# Patient Record
Sex: FEMALE | URBAN - METROPOLITAN AREA
[De-identification: names, ages, dates, MRNs, and addresses within clinical notes are randomized per-mention and may not be internally consistent; named-entity substitution may affect disease eponyms.]

---

## 2023-09-28 PROBLEM — Z00.00 ENCOUNTER FOR PREVENTIVE HEALTH EXAMINATION: Status: ACTIVE | Noted: 2023-09-28

## 2023-09-29 ENCOUNTER — OUTPATIENT (OUTPATIENT)
Dept: OUTPATIENT SERVICES | Facility: HOSPITAL | Age: 38
LOS: 1 days | End: 2023-09-29
Payer: COMMERCIAL

## 2023-09-29 ENCOUNTER — APPOINTMENT (OUTPATIENT)
Dept: ULTRASOUND IMAGING | Facility: CLINIC | Age: 38
End: 2023-09-29

## 2023-09-29 DIAGNOSIS — E04.9 NONTOXIC GOITER, UNSPECIFIED: ICD-10-CM

## 2023-09-29 PROCEDURE — 76536 US EXAM OF HEAD AND NECK: CPT

## 2023-09-29 PROCEDURE — 76536 US EXAM OF HEAD AND NECK: CPT | Mod: 26

## 2024-11-25 ENCOUNTER — EMERGENCY (EMERGENCY)
Facility: HOSPITAL | Age: 39
LOS: 1 days | Discharge: HOME | End: 2024-11-25
Attending: EMERGENCY MEDICINE
Payer: COMMERCIAL

## 2024-11-25 VITALS
SYSTOLIC BLOOD PRESSURE: 123 MMHG | DIASTOLIC BLOOD PRESSURE: 84 MMHG | OXYGEN SATURATION: 97 % | TEMPERATURE: 99 F | HEART RATE: 92 BPM | WEIGHT: 110.89 LBS | RESPIRATION RATE: 20 BRPM

## 2024-11-25 DIAGNOSIS — K21.9 GASTRO-ESOPHAGEAL REFLUX DISEASE WITHOUT ESOPHAGITIS: ICD-10-CM

## 2024-11-25 DIAGNOSIS — E04.1 NONTOXIC SINGLE THYROID NODULE: ICD-10-CM

## 2024-11-25 LAB
ALBUMIN SERPL ELPH-MCNC: 4.2 G/DL — SIGNIFICANT CHANGE UP (ref 3.3–5)
ALP SERPL-CCNC: 46 U/L — SIGNIFICANT CHANGE UP (ref 40–120)
ALT FLD-CCNC: 17 U/L — SIGNIFICANT CHANGE UP (ref 10–45)
ANION GAP SERPL CALC-SCNC: 13 MMOL/L — SIGNIFICANT CHANGE UP (ref 5–17)
APTT BLD: 34.4 SEC — SIGNIFICANT CHANGE UP (ref 24.5–35.6)
AST SERPL-CCNC: 16 U/L — SIGNIFICANT CHANGE UP (ref 10–40)
BASOPHILS # BLD AUTO: 0.04 K/UL — SIGNIFICANT CHANGE UP (ref 0–0.2)
BASOPHILS NFR BLD AUTO: 0.4 % — SIGNIFICANT CHANGE UP (ref 0–2)
BILIRUB SERPL-MCNC: 0.3 MG/DL — SIGNIFICANT CHANGE UP (ref 0.2–1.2)
BUN SERPL-MCNC: 11 MG/DL — SIGNIFICANT CHANGE UP (ref 7–23)
CALCIUM SERPL-MCNC: 9.3 MG/DL — SIGNIFICANT CHANGE UP (ref 8.4–10.5)
CHLORIDE SERPL-SCNC: 105 MMOL/L — SIGNIFICANT CHANGE UP (ref 96–108)
CO2 SERPL-SCNC: 22 MMOL/L — SIGNIFICANT CHANGE UP (ref 22–31)
CREAT SERPL-MCNC: 0.52 MG/DL — SIGNIFICANT CHANGE UP (ref 0.5–1.3)
EGFR: 122 ML/MIN/1.73M2 — SIGNIFICANT CHANGE UP
EOSINOPHIL # BLD AUTO: 0.12 K/UL — SIGNIFICANT CHANGE UP (ref 0–0.5)
EOSINOPHIL NFR BLD AUTO: 1.3 % — SIGNIFICANT CHANGE UP (ref 0–6)
GLUCOSE SERPL-MCNC: 97 MG/DL — SIGNIFICANT CHANGE UP (ref 70–99)
HCG SERPL-ACNC: <2 MIU/ML — SIGNIFICANT CHANGE UP
HCT VFR BLD CALC: 38.1 % — SIGNIFICANT CHANGE UP (ref 34.5–45)
HGB BLD-MCNC: 12.5 G/DL — SIGNIFICANT CHANGE UP (ref 11.5–15.5)
IMM GRANULOCYTES NFR BLD AUTO: 0.2 % — SIGNIFICANT CHANGE UP (ref 0–0.9)
INR BLD: 1.05 RATIO — SIGNIFICANT CHANGE UP (ref 0.85–1.16)
LYMPHOCYTES # BLD AUTO: 1.68 K/UL — SIGNIFICANT CHANGE UP (ref 1–3.3)
LYMPHOCYTES # BLD AUTO: 18.8 % — SIGNIFICANT CHANGE UP (ref 13–44)
MAGNESIUM SERPL-MCNC: 2.2 MG/DL — SIGNIFICANT CHANGE UP (ref 1.6–2.6)
MCHC RBC-ENTMCNC: 28.2 PG — SIGNIFICANT CHANGE UP (ref 27–34)
MCHC RBC-ENTMCNC: 32.8 G/DL — SIGNIFICANT CHANGE UP (ref 32–36)
MCV RBC AUTO: 86 FL — SIGNIFICANT CHANGE UP (ref 80–100)
MONOCYTES # BLD AUTO: 0.78 K/UL — SIGNIFICANT CHANGE UP (ref 0–0.9)
MONOCYTES NFR BLD AUTO: 8.7 % — SIGNIFICANT CHANGE UP (ref 2–14)
NEUTROPHILS # BLD AUTO: 6.3 K/UL — SIGNIFICANT CHANGE UP (ref 1.8–7.4)
NEUTROPHILS NFR BLD AUTO: 70.6 % — SIGNIFICANT CHANGE UP (ref 43–77)
NRBC # BLD: 0 /100 WBCS — SIGNIFICANT CHANGE UP (ref 0–0)
PLATELET # BLD AUTO: 317 K/UL — SIGNIFICANT CHANGE UP (ref 150–400)
POTASSIUM SERPL-MCNC: 4 MMOL/L — SIGNIFICANT CHANGE UP (ref 3.5–5.3)
POTASSIUM SERPL-SCNC: 4 MMOL/L — SIGNIFICANT CHANGE UP (ref 3.5–5.3)
PROT SERPL-MCNC: 7.6 G/DL — SIGNIFICANT CHANGE UP (ref 6–8.3)
PROTHROM AB SERPL-ACNC: 12.1 SEC — SIGNIFICANT CHANGE UP (ref 9.9–13.4)
RBC # BLD: 4.43 M/UL — SIGNIFICANT CHANGE UP (ref 3.8–5.2)
RBC # FLD: 13.4 % — SIGNIFICANT CHANGE UP (ref 10.3–14.5)
SODIUM SERPL-SCNC: 140 MMOL/L — SIGNIFICANT CHANGE UP (ref 135–145)
T3 SERPL-MCNC: 97 NG/DL — SIGNIFICANT CHANGE UP (ref 80–200)
T4 FREE SERPL-MCNC: 1.1 NG/DL — SIGNIFICANT CHANGE UP (ref 0.9–1.8)
THYROPEROXIDASE AB SERPL-ACNC: <9 IU/ML — SIGNIFICANT CHANGE UP
TROPONIN T, HIGH SENSITIVITY RESULT: <6 NG/L — SIGNIFICANT CHANGE UP (ref 0–51)
TSH SERPL-MCNC: 1.26 UIU/ML — SIGNIFICANT CHANGE UP (ref 0.27–4.2)
WBC # BLD: 8.94 K/UL — SIGNIFICANT CHANGE UP (ref 3.8–10.5)
WBC # FLD AUTO: 8.94 K/UL — SIGNIFICANT CHANGE UP (ref 3.8–10.5)

## 2024-11-25 PROCEDURE — 99223 1ST HOSP IP/OBS HIGH 75: CPT

## 2024-11-25 PROCEDURE — 99204 OFFICE O/P NEW MOD 45 MIN: CPT

## 2024-11-25 PROCEDURE — 93880 EXTRACRANIAL BILAT STUDY: CPT | Mod: 26,59

## 2024-11-25 PROCEDURE — 76536 US EXAM OF HEAD AND NECK: CPT | Mod: 26

## 2024-11-25 RX ORDER — SODIUM CHLORIDE 9 MG/ML
1000 INJECTION, SOLUTION INTRAMUSCULAR; INTRAVENOUS; SUBCUTANEOUS
Refills: 0 | Status: ACTIVE | OUTPATIENT
Start: 2024-11-25 | End: 2025-10-24

## 2024-11-25 RX ADMIN — SODIUM CHLORIDE 100 MILLILITER(S): 9 INJECTION, SOLUTION INTRAMUSCULAR; INTRAVENOUS; SUBCUTANEOUS at 17:49

## 2024-11-25 NOTE — ED ADULT NURSE NOTE - NSFALLUNIVINTERV_ED_ALL_ED
Bed/Stretcher in lowest position, wheels locked, appropriate side rails in place/Call bell, personal items and telephone in reach/Instruct patient to call for assistance before getting out of bed/chair/stretcher/Non-slip footwear applied when patient is off stretcher/Sells to call system/Physically safe environment - no spills, clutter or unnecessary equipment/Purposeful proactive rounding/Room/bathroom lighting operational, light cord in reach

## 2024-11-25 NOTE — ED CDU PROVIDER DISPOSITION NOTE - CLINICAL COURSE
39-year-old female presenting for palpitations and neck swelling.  Patient reports that she has a similar presentation 2 years ago (treated at [facility]) was related with hyperthyroidism and was treated on an unknown medication for few days and it resolved.  Symptoms returned over the past week.  Associated with pain and anterior neck swelling, intermittent palpitations.  Denies any recent illnesses does endorse a 17 pound weight loss over the past few months however was intentional with exercise and diet. Endorses regular menstrual periods.     In ED, labs unremarkable, US showed___________, endocrine consulted for evaluation of thyroid cysts, placed in CDU for endo c/s and IR drainage of thyroid cyst. 39-year-old female presenting for palpitations and neck swelling.  Patient reports that she has a similar presentation 2 years ago (treated at [facility]) was related with hyperthyroidism and was treated on an unknown medication for few days and it resolved.  Symptoms returned over the past week.  Associated with pain and anterior neck swelling, intermittent palpitations.  Denies any recent illnesses does endorse a 17 pound weight loss over the past few months however was intentional with exercise and diet. Endorses regular menstrual periods.     In ED, labs unremarkable, US showed___________, endocrine consulted for evaluation of thyroid cysts, placed in CDU for endo c/s and IR drainage of thyroid cyst. In cdu, pt seen by endo and ENT. IR and ultrasound cannot do the FNA for 3 days. pt does not require inpatient for this, however, we did offer to patient for also pain control and patient declined. pt wants to go home. pt will f/up outpatient. F/up coordinator arranging rapid f/up appointments.

## 2024-11-25 NOTE — ED CDU PROVIDER DISPOSITION NOTE - NSFOLLOWUPINSTRUCTIONS_ED_ALL_ED_FT
Follow up with your endocrinologist in the next week. Call their office to make an appointment    Follow up with your primary care provider in the next 2-3 days    Return to the Emergency Department for any pain, redness, swelling, drainage/discharge from the drainage site, fevers, chills, shortness of breath, difficulty breathing and all other concerns. 1. Stay hydrated.  2. Take Tylenol 975mg every 8hrs for pain as needed. Can take IBuprofen 600mg every 6-8hrs for pain as needed with food- would discuss with specialist if ok to take prior to procedure.  3. Follow up with your PCP in 1-2 days. Follow up with  [facility] upon discharge (Dr. Giordano, Dr. Martinez, Dr. Resendez, Dr. Grewal) call [phone] to make an appointment. (we are also working on getting you an expedited appointment. If you don't hear from us you can contact [phone]  (Bring printed results to your doctor visit).  4. Return if symptoms, worsen, fever, weakness, chest pain, difficulty breathing, dizziness and all other concerns. 1. Stay hydrated.  2. Take Tylenol 975mg every 8hrs for pain as needed. Can take IBuprofen 600mg every 6-8hrs for pain as needed with food- would discuss with specialist if ok to take prior to procedure.  3. Follow up with your PCP in 1-2 days. Follow up with  [facility] upon discharge (Dr. Giordano, Dr. Martinez, Dr. Resendez, Dr. Grewal) call [phone] to make an appointment. (we are also working on getting you an expedited appointment. If you don't hear from us you can contact [phone] - our follow up coordinator). You will also need Endocrinology follow up, we are also helping coordinate this but if you don't hear from us please contact coordinator.  Follow up outpatient with Dr. Ira Harrell for further management, information given.   (Bring printed results to your doctor visit).  4. Return if symptoms, worsen, fever, weakness, difficulty breathing, difficulty swallowing and all other concerns.  ***you need U/S guided FNA biopsy of thyroid which [facility] surgery or Endo can send you for, please follow up*****    Please follow up with your doctors:  CT neck: "Dominant left thyroid nodule better evaluated on the recent thyroid ultrasound. Mild tracheal narrowing and rightward deviation."  U/S thyroid: "1. Bilateral thyroid gland nodules, as detailed above. There is interval   increase in size and complexity of a left thyroid lobe complex mixed   solid cystic hypoechoic nodule which now measures 4.2 x 2.8 x 3.7 cm,   TIRADS 3. This contains solid components with arterial flow as well as   increased complexity of the cystic component. Low-level echoes within the   cystic component may be due to interval hemorrhage into this lesion.   Further evaluation with fine-needle aspiration biopsy under ultrasound   guidance is recommended.  2. Interval increase in size of 1.2 cm solid, mildly hypoechoic nodule   within the posterior mid to lower pole of the right thyroid lobe, TI RADS   4. Further assessment with FNA of this nodule may also be considered.  3. Internal echoes are noted within the left common carotid artery, with   the appearance of low velocity/sluggish flow on real-time imaging. The   left common carotid artery is demonstrated to be grossly patent on this   examination but otherwise not assessed. The significance of this finding   is uncertain. For further evaluation, dedicated carotid Doppler   ultrasound is advised."  Carotid duplex negative.    Thyroid Nodule  An adult, showing the location of the thyroid gland.  A thyroid nodule is an isolated growth of thyroid cells that forms a lump in the thyroid gland. The thyroid gland is a butterfly-shaped gland found in the lower front of the neck. It sends chemical messengers (hormones) through the blood to all parts of the body. These hormones are important in regulating body temperature and helping the body use energy.    Thyroid nodules are common. Most are not cancerous (are benign). You may have one nodule or several nodules.    There are different types of thyroid nodules. They include nodules that:  Grow and fill with fluid (thyroid cysts).  Produce too much thyroid hormone (hot nodules or hyperthyroid).  Produce no thyroid hormone (cold nodules or hypothyroid).  Form from cancer cells (thyroid cancers).  What are the causes?  In most cases, the cause of thyroid nodules is not known.    What increases the risk?  The following factors may make you more likely to develop thyroid nodules:  Age. Thyroid nodules are more common in people who are older than 45 years.  Female gender.  A family history that includes:  Thyroid nodules.  Pheochromocytoma.  Thyroid carcinoma.  Hyperparathyroidism.  Certain thyroid diseases, such as Hashimoto's thyroiditis.  Lack of iodine in your diet.  A history of head and neck radiation, such as from cancer treatments.  Type 2 diabetes.  What are the signs or symptoms?  In many cases, there are no symptoms. If you have symptoms, they may include:  A lump in your lower neck.  Feeling pressure, fullness, or a tickle in your throat.  Pain in your neck, jaw, or ear.  Having trouble swallowing or breathing.  Hot nodules may cause:  Weight loss.  Warm, flushed skin.  Feeling hot.  Feeling nervous.  A rapid or irregular heartbeat.  Cold nodules may cause:  Weight gain.  Dry skin.  Hair loss, brittle hair, or both.  Feeling cold.  Fatigue.  Thyroid cancer nodules may cause:  Hard nodules that can be felt along the thyroid gland.  Hoarseness.  Lumps in the tissue (lymph nodes) near your thyroid gland.  How is this diagnosed?  A thyroid nodule may be felt by your health care provider during a physical exam. This condition may also be diagnosed based on your symptoms. You may also have tests, including:  Blood tests to check how well your thyroid is working.  An ultrasound. This may be done to confirm the diagnosis.  A biopsy. This involves taking a sample from the nodule and looking at it under a microscope.  A thyroid scan. This test creates an image of the thyroid gland using a radioactive tracer.  Imaging tests such as an MRI or CT scan. These may be done if:  A nodule is large.  A nodule is blocking your airway.  Cancer is suspected.  How is this treated?  Treatment depends on the cause and size of your nodule or nodules. If a nodule is benign, treatment may not be necessary. Your health care provider may monitor the nodule to see if it goes away without treatment. If a nodule continues to grow, is cancerous, or does not go away, treatment may be needed. Treatment may include:  Having a cystic nodule drained with a needle.  Ablation therapy. In this treatment, alcohol is injected into the area of the nodule to destroy the cells. Ablation with heat may also be used. This is called thermal ablation.  Radioactive iodine. In this treatment, radioactive iodine is given as a pill or liquid that you drink. This substance causes the thyroid nodule to shrink.  Surgery to remove the nodule or nodules. Part or all of your thyroid gland may also need to be removed.  Medicines to treat hyperthyroidism.  Follow these instructions at home:  Pay attention to any changes in your thyroid nodule or nodules.  Take over-the-counter and prescription medicines only as told by your health care provider.  Keep all follow-up visits. This is important.  Contact a health care provider if:  You have trouble sleeping.  You have muscle weakness.  You have significant weight loss without changing your eating habits.  You feel nervous.  You have trouble swallowing.  You have increased swelling.  You have a rapid or irregular heartbeat.  Get help right away if:  You have chest pain.  You faint or lose consciousness.  Your nodule makes it hard for you to breathe.  These symptoms may be an emergency. Get help right away. Call 911.  Do not wait to see if the symptoms will go away.  Do not drive yourself to the hospital.  Summary  A thyroid nodule is an isolated growth of thyroid cells that forms a lump in your thyroid gland.  Thyroid nodules are common. Most are not cancerous.  Your health care provider may monitor the nodule to see if it goes away without treatment. If a nodule continues to grow, is cancerous, or does not go away, treatment may be needed.  Treatment depends on the cause and size of your nodule or nodules.  This information is not intended to replace advice given to you by your health care provider. Make sure you discuss any questions you have with your health care provider.    Document Revised: 10/31/2022 Document Reviewed: 10/31/2022  [employer] Patient Education © 2024 [employer]  [employer] logo  Terms and Conditions  Privacy Policy  Editorial Policy  All content on this site: Copyright © 2024 Else[employer], its licensors, and contributors. All rights are reserved, including those for text and data mining, AI training, and similar technologies. For all open access content, the Creative Commons licensing terms apply.  Cookies are used by this site. To decline or learn more, visit our Cookies page.

## 2024-11-25 NOTE — CONSULT NOTE ADULT - PROBLEM SELECTOR RECOMMENDATION 2
- Recommend trial of PPI  - Avoid common triggers, which includes but is not limited to, spicy/acidic foods, coffee, chocolate, mint, etc.  - Avoid eating or drinking 2-3 hours before bedtime and lying down  - Elevation of HOB or use an extra pillow for sleep - Recommend CT Neck with IV contrast  - Further recommendations to follow pending CT Neck findings

## 2024-11-25 NOTE — CONSULT NOTE ADULT - SUBJECTIVE AND OBJECTIVE BOX
HPI: Patient is a 39 year old female who presented to the hospital with dysphagia and pain in her thyroid.   Endocrinology consulted for thyroid cyst.     ENDOCRINE HX:  Patient is a 39 year old female with past medical history of thyroid cyst (follows with endocrinologist Dr. Ira Harrell at [facility], has never had any sort of FNA or aspiration) who presented to the hospital with dysphagia and pain.  She reports that she first noticed the cyst 2 years ago and it has been getting larger since. She is presenting now with pain and dysphagia.   She denies ever using thyroid medications.   She reports getting serial ultrasounds with her endocrinologist.   On ROS she reports 20 lbs of intentional weight loss through exercise over the past 6 months. She denies frequent bowel movements, constipation. She reports regular periods. She does not have a tremor. Denies fevers or chills.     PAST MEDICAL & SURGICAL HISTORY:      FAMILY HISTORY: She reports family history of thyroid problem in her father but is not sure what. She thinks he may have needed thyroid surgery.       MEDICATIONS  (STANDING):    MEDICATIONS  (PRN):      Allergies    No Known Allergies    Intolerances      Review of Systems:  Constitutional: No fever  Eyes: No blurry vision  Neuro: No tremors  HEENT: pain  Cardiovascular: No chest pain, palpitations  Respiratory: No SOB, no cough  GI: No nausea, vomiting, abdominal pain  : No dysuria  Skin: no rash  Psych: no depression  Endocrine: no polyuria, polydipsia  Hem/lymph: no swelling  Osteoporosis: no fractures    ALL OTHER SYSTEMS REVIEWED AND NEGATIVE    PHYSICAL EXAM:  VITALS: T(C): 37.1 (11-25-24 @ 15:59)  T(F): 98.7 (11-25-24 @ 15:59), Max: 98.8 (11-25-24 @ 10:43)  HR: 86 (11-25-24 @ 15:59) (83 - 92)  BP: 156/94 (11-25-24 @ 15:59) (123/84 - 156/94)  RR:  (20 - 20)  SpO2:  (97% - 100%)  Wt(kg): 50.3 kg  GENERAL: NAD, well-groomed, well-developed  EYES: No proptosis, no lid lag, anicteric  HEENT:  Atraumatic, Normocephalic, moist mucous membranes  THYROID: Large, mobile mass of left thyroid, mildly tender to palpation, mobile with swallowing.   RESPIRATORY: Normal respiratory effort; no audible wheezing  SKIN: Dry, intact, No rashes or lesions  MUSCULOSKELETAL: Full range of motion, normal strength  NEURO: sensation intact, extraocular movements intact, no tremor  PSYCH: Alert and oriented x 3, normal affect, normal mood  CUSHING'S SIGNS: no striae      CAPILLARY BLOOD GLUCOSE                                12.5   8.94  )-----------( 317      ( 25 Nov 2024 11:49 )             38.1       11-25    140  |  105  |  11  ----------------------------<  97  4.0   |  22  |  0.52    eGFR: 122    Ca    9.3      11-25  Mg     2.2     11-25    TPro  7.6  /  Alb  4.2  /  TBili  0.3  /  DBili  x   /  AST  16  /  ALT  17  /  AlkPhos  46  11-25                   HPI: Patient is a 39 year old female who presented to the hospital with dysphagia and pain in her thyroid.   Endocrinology consulted for thyroid cyst.     ENDOCRINE HX:  Patient is a 39 year old female with past medical history of thyroid cyst (follows with endocrinologist Dr. Ira Harrell at [facility], has never had any sort of FNA or aspiration) who presented to the hospital with dysphagia and pain.  She reports that she first noticed the cyst 2 years ago and it has been getting larger since. She is presenting now with pain and dysphagia.   She denies ever using thyroid medications.   She reports getting serial ultrasounds with her endocrinologist.   On ROS she reports 20 lbs of intentional weight loss through exercise over the past 6 months. She denies frequent bowel movements, constipation. She reports regular periods. She does not have a tremor. Denies fevers or chills.     PAST MEDICAL & SURGICAL HISTORY:      FAMILY HISTORY: She reports family history of thyroid problem in her father but is not sure what. She thinks he may have needed thyroid surgery.       MEDICATIONS  (STANDING):    MEDICATIONS  (PRN):      Allergies    No Known Allergies    Intolerances      Review of Systems:  Constitutional: No fever  Eyes: No blurry vision  Neuro: No tremors  HEENT: pain  Cardiovascular: No chest pain, palpitations  Respiratory: No SOB, no cough  GI: No nausea, vomiting, abdominal pain  : No dysuria  Skin: no rash  Psych: no depression  Endocrine: no polyuria, polydipsia  Hem/lymph: no swelling  Osteoporosis: no fractures    ALL OTHER SYSTEMS REVIEWED AND NEGATIVE    PHYSICAL EXAM:  VITALS: T(C): 37.1 (11-25-24 @ 15:59)  T(F): 98.7 (11-25-24 @ 15:59), Max: 98.8 (11-25-24 @ 10:43)  HR: 86 (11-25-24 @ 15:59) (83 - 92)  BP: 156/94 (11-25-24 @ 15:59) (123/84 - 156/94)  RR:  (20 - 20)  SpO2:  (97% - 100%)  Wt(kg): 50.3 kg  GENERAL: NAD, well-groomed, well-developed  EYES: No proptosis, no lid lag, anicteric  HEENT:  Atraumatic, Normocephalic, moist mucous membranes  THYROID: Large, mobile mass of left thyroid, mildly tender to palpation, mobile with swallowing.   RESPIRATORY: Normal respiratory effort; no audible wheezing  SKIN: Dry, intact, No rashes or lesions  MUSCULOSKELETAL: Full range of motion, normal strength  NEURO: sensation intact, extraocular movements intact, no tremor  PSYCH: Alert and oriented x 3, normal affect, normal mood  CUSHING'S SIGNS: no striae      CAPILLARY BLOOD GLUCOSE                                12.5   8.94  )-----------( 317      ( 25 Nov 2024 11:49 )             38.1       11-25    140  |  105  |  11  ----------------------------<  97  4.0   |  22  |  0.52    eGFR: 122    Ca    9.3      11-25  Mg     2.2     11-25    TPro  7.6  /  Alb  4.2  /  TBili  0.3  /  DBili  x   /  AST  16  /  ALT  17  /  AlkPhos  46  11-25        Thyroid US  IMPRESSION:  1. Bilateral thyroid gland nodules, as detailed above. There is interval   increase in size and complexity of a left thyroid lobe complex mixed   solid cystic hypoechoic nodule which now measures 4.2 x 2.8 x 3.7 cm,   TIRADS 3. This contains solid components with arterial flow as well as   increased complexity of the cystic component. Low-level echoes within the   cystic component may be due to interval hemorrhage into this lesion.   Further evaluation with fine-needle aspiration biopsy under ultrasound   guidance is recommended.  2. Interval increase in size of 1.2 cm solid, mildly hypoechoic nodule   within the posterior mid to lower pole of the right thyroid lobe, TI RADS   4. Further assessment with FNA of this nodule may also be considered.  3. Internal echoes are noted within the left common carotid artery, with   the appearance of low velocity/sluggish flow on real-time imaging. The   left common carotid artery is demonstrated to be grossly patent on this   examination but otherwise not assessed. The significance of this finding   is uncertain. For further evaluation, dedicated carotid Doppler   ultrasound is advised.

## 2024-11-25 NOTE — ED CDU PROVIDER INITIAL DAY NOTE - ATTENDING APP SHARED VISIT CONTRIBUTION OF CARE
ATTENDING, Ammon MAGALLON: I have personally performed a face to face diagnostic evaluation on this patient.  I have reviewed the ACP note and agree with the history, exam, and plan of care, except as noted here. Progress notes and further evaluation to be reviewed by observation and discharging attending.

## 2024-11-25 NOTE — ED PROVIDER NOTE - ATTENDING CONTRIBUTION TO CARE
hx of "thyroid problem" - cannot pull up chart on her phone, no diagnosis is known by pt or her . takes no meds. needs q6 sono, no biopsy. care at [facility] dr grissom [phone] worsening over 1 wk, some sob from  from such.  sore neck anteriorly, no fever   pain to swallow but can swallow  no redness or warmth or tenderness to ant neck, from neck throat is nl arely secretions  clear lungs, rrr  mult calls in attempt to get in touch w dr santos grissom regarding her hx and   dw rn at [facility], sono showed nodules, was on synthroid from  in [place] 100 mcg but not prescribed there. had had sob in dec 2023 but not in sep 2024

## 2024-11-25 NOTE — ED ADULT NURSE NOTE - ED STAT RN HANDOFF DETAILS 2
Patient  received  alert  and  oriented x4.  Color is  good  and  skin warm to touch.  Swelling  persists to  her  neck. She  is  awaiting for  ENT eval.

## 2024-11-25 NOTE — ED PROVIDER NOTE - PROGRESS NOTE DETAILS
paged/emailed endo and IR regarding possible drainage of thyroid cyst. Additionally spoke with CDU PA about placement in observation.

## 2024-11-25 NOTE — ED ADULT NURSE NOTE - OBJECTIVE STATEMENT
38 y/o F complaining of worsening goiter. Pt also complaining of palpitations. Pt has been seen outpatient for thyroid issues in the past but does not currently take any medications. Denies chest pain, sob, n/v/d, and dysuria. AAOx3. Breathing spontaneous and unlabored. Abdomen soft. nondistended, and nontender. Skin warm dry and color normal for race.

## 2024-11-25 NOTE — CONSULT NOTE ADULT - ASSESSMENT
38 y/o Female, presenting for palpitations and neck swelling. Patient reports that she has a similar presentation 2 years ago (treated at [facility]) was related with hyperthyroidism and was treated on an unknown medication for few days and it resolved. Symptoms returned over the past week. Associated with dysphagia and odynophagia x1 week. ENT consulted for further evaluation. Thyroid ultrasound performed in the ED showed " Bilateral thyroid gland nodules, as detailed above. There is interval increase in size and complexity of a left thyroid lobe complex mixed solid cystic hypoechoic nodule which now measures 4.2 x 2.8 x 3.7 cm, TIRADS 3. This contains solid components with arterial flow as well as increased complexity of the cystic component. Low-level echoes within the cystic component may be due to interval hemorrhage into this lesion. Further evaluation with fine-needle aspiration biopsy under ultrasound guidance is recommended. 2. Interval increase in size of 1.2 cm solid, mildly hypoechoic nodule within the posterior mid to lower pole of the right thyroid lobe, TI RADS 4. Further assessment with FNA of this nodule may also be considered. 3. Internal echoes are noted within the left common carotid artery, with the appearance of low velocity/sluggish flow on real-time imaging. The left common carotid artery is demonstrated to be grossly patent on this examination but otherwise not assessed. The significance of this finding   is uncertain. For further evaluation, dedicated carotid Doppler ultrasound is advised." On exam, +Large thyroid cyst on anterior neck with mild TTP. Flexible laryngoscopy showed Pachydermia c/w LPRD.  40 y/o Female, presenting for palpitations and neck swelling. Patient reports that she has a similar presentation 2 years ago (treated at [facility]) was related with hyperthyroidism and was treated on an unknown medication for few days and it resolved. Symptoms returned over the past week. Associated with dysphagia and odynophagia x1 week. ENT consulted for further evaluation. Thyroid ultrasound performed in the ED showed " Bilateral thyroid gland nodules, as detailed above. There is interval increase in size and complexity of a left thyroid lobe complex mixed solid cystic hypoechoic nodule which now measures 4.2 x 2.8 x 3.7 cm, TIRADS 3. This contains solid components with arterial flow as well as increased complexity of the cystic component. Low-level echoes within the cystic component may be due to interval hemorrhage into this lesion. Further evaluation with fine-needle aspiration biopsy under ultrasound guidance is recommended. 2. Interval increase in size of 1.2 cm solid, mildly hypoechoic nodule within the posterior mid to lower pole of the right thyroid lobe, TI RADS 4. Further assessment with FNA of this nodule may also be considered. 3. Internal echoes are noted within the left common carotid artery, with the appearance of low velocity/sluggish flow on real-time imaging. The left common carotid artery is demonstrated to be grossly patent on this examination but otherwise not assessed. The significance of this finding is uncertain. For further evaluation, dedicated carotid Doppler ultrasound is advised." On exam, +Large thyroid cyst on anterior neck with mild TTP. Flexible laryngoscopy showed Pachydermia c/w LPRD.  38 y/o Female, presenting for palpitations and neck swelling. Patient reports that she has a similar presentation 2 years ago (treated at [facility]) was related with hyperthyroidism and was treated on an unknown medication for few days and it resolved. Symptoms returned over the past week. Associated with dysphagia and odynophagia x1 week. ENT consulted for further evaluation. Pt stated that prior to 1 week ago, neck swelling was not present. Thyroid ultrasound performed in the ED showed " Bilateral thyroid gland nodules, as detailed above. There is interval increase in size and complexity of a left thyroid lobe complex mixed solid cystic hypoechoic nodule which now measures 4.2 x 2.8 x 3.7 cm, TIRADS 3. This contains solid components with arterial flow as well as increased complexity of the cystic component. Low-level echoes within the cystic component may be due to interval hemorrhage into this lesion. Further evaluation with fine-needle aspiration biopsy under ultrasound guidance is recommended. 2. Interval increase in size of 1.2 cm solid, mildly hypoechoic nodule within the posterior mid to lower pole of the right thyroid lobe, TI RADS 4. Further assessment with FNA of this nodule may also be considered. 3. Internal echoes are noted within the left common carotid artery, with the appearance of low velocity/sluggish flow on real-time imaging. The left common carotid artery is demonstrated to be grossly patent on this examination but otherwise not assessed. The significance of this finding is uncertain. For further evaluation, dedicated carotid Doppler ultrasound is advised." Pt admits to about ~20lb weight loss but is intentional (walks around 7 miles a day). On exam, +Large thyroid cyst on anterior neck with mild TTP. Flexible laryngoscopy showed Pachydermia c/w LPRD.

## 2024-11-25 NOTE — CONSULT NOTE ADULT - ASSESSMENT
Patient is a 39 year old female with past medical history of thyroid cyst who presented to the ED with throat pain.   Endocrinology consulted for thyroid cyst.    #Thyroid cyst  - left thyroid cyst on thyroid US  - right thyroid appears spongiform on my read of the US. Formal read pending.   - TFTs pending  PLAN:  - IR consulted for cyst drainage  - Cystic nodules are either low or very low likelihood for malignancy. She may require thyroidectomy for compressive symptoms.   - Follow up outpatient with her provider Dr. Ira Harrell for further management    Pending discussion with attending physician.  INCOMPLETE NOTE  Patient is a 39 year old female with past medical history of thyroid cyst who presented to the ED with throat pain.   Endocrinology consulted for thyroid cyst.    #Thyroid cyst  - thyroid US with multiple nodules, large cyst on the left with hemorrhage   - TFTs pending  PLAN:  - IR consulted for cyst drainage, recommending ENT consult and CT neck  - Follow up thyroid function testing  - Cystic nodules are either low or very low likelihood for malignancy. She may require thyroidectomy for compressive symptoms.   - Follow up outpatient with her provider Dr. Ira Harrell for further management    Discussed with attending physician Dr. Sprague.  Ernst Aguila DO   PGY-4 Endocrine Fellow  Can be reached via Microsoft Teams.    For follow up questions, discharge recommendations or new consults, please email [email or web address] or [email or web address] ([facility]) or call the answering service at [phone] (weekdays); [phone] (nights/weekends).   For emergencies, please page fellow on call.  Patient is a 39 year old female with past medical history of thyroid cyst who presented to the ED with throat pain.   Endocrinology consulted for thyroid cyst.    #Thyroid nodule  # Dysphagia/compressive symptoms  - thyroid US with multiple nodules, large cyst on the left with hemorrhage   - TFTs pending  PLAN:  - IR consulted for cyst drainage, recommending ENT consult and CT neck  - Follow up thyroid function testing  - Cystic nodules are either low or very low likelihood for malignancy. She may require thyroidectomy for compressive symptoms.   - Follow up outpatient with her provider Dr. Ira Harrell for further management    Discussed with attending physician Dr. Sprague.  Ernst Aguila DO   PGY-4 Endocrine Fellow  Can be reached via Microsoft Teams.    For follow up questions, discharge recommendations or new consults, please email [email or web address] ([facility]) or [email or web address] ([facility]) or call the answering service at [phone] (weekdays); [phone] (nights/weekends).   For emergencies, please page fellow on call.

## 2024-11-25 NOTE — ED CDU PROVIDER DISPOSITION NOTE - PATIENT PORTAL LINK FT
You can access the FollowMyHealth Patient Portal offered by [facility] by registering at the following website: [email or web address]. By joining [facility]’s FollowMyHealth portal, you will also be able to view your health information using other applications (apps) compatible with our system.

## 2024-11-25 NOTE — ED PROVIDER NOTE - OBJECTIVE STATEMENT
39-year-old female presenting for palpitations and neck swelling.  Patient reports that she has a similar presentation 2 years ago (treated at [facility]) was related with hyperthyroidism and was treated on an unknown medication for few days and it resolved.  Symptoms returned over the past week.  Associated with pain and anterior neck swelling, intermittent palpitations.  Denies any recent illnesses does endorse a 17 pound weight loss over the past few months however was intentional with exercise and diet. Endorses regular menstrual periods.

## 2024-11-25 NOTE — CONSULT NOTE ADULT - ATTENDING COMMENTS
Agree with Dr. Valenzuela's assessment and plan as outlined above. Reviewed all pertinent labs, and imaging studies. Modifications made as indicated above. 39 F with history of thyroid nodule here for throat pain. Patient with compressive symptoms including SOB and throat pain and diffculty swallow. Thyroid ultrasound shows left thyroid nodule that is solid and cystic hypoechoic measures 4.2x2.8x3.7 cm. Recommend FNA either by IR inpatient or outpatient FNA with endocrinology. pending TFT. Rest of the plan as above.

## 2024-11-25 NOTE — ED PROVIDER NOTE - PHYSICAL EXAMINATION
Physical Exam:  General: NAD, Conversive  Eyes: EOMI, Conjunctiva and sclera clear. Pupils equal and reactive  Neck: anterior mid neck swelling, slight tenderness and nodule to the L > R side  Lungs: No respiratory distress  Heart: Normal peripheral perfusion  Abdomen: Soft, nontender, nondistended, no CVA tenderness  Extremities: 2+ peripheral pulses, no edema  Psych: AAO X3  Neurologic: Non-focal, ambulating, CN I-XII intact

## 2024-11-25 NOTE — CONSULT NOTE ADULT - SUBJECTIVE AND OBJECTIVE BOX
CC: Thyroid nodule found on US    HPI: 40 y/o Female, presenting for palpitations and neck swelling.  Patient reports that she has a similar presentation 2 years ago (treated at [facility]) was related with hyperthyroidism and was treated on an unknown medication for few days and it resolved.  Symptoms returned over the past week.  Associated with pain and anterior neck swelling, intermittent palpitations.  Denies any recent illnesses does endorse a 17 pound weight loss over the past few months however was intentional with exercise and diet. Endorses regular menstrual periods.        PAST MEDICAL & SURGICAL HISTORY:    Allergies    No Known Allergies    Intolerances      MEDICATIONS  (STANDING):  sodium chloride 0.9%. 1000 milliLiter(s) (100 mL/Hr) IV Continuous <Continuous>    MEDICATIONS  (PRN):      Social History: No pertinent SHx    Family history: No pertinent FHx    ROS:   ENT: all negative except as noted in HPI   CV: denies palpitations  Pulm: denies SOB, cough, hemoptysis  GI: denies indigestion, n/v  : denies pertinent urinary symptoms, urgency  Neuro: denies numbness/tingling, loss of sensation  Psych: denies anxiety  MS: denies muscle weakness, instability  Heme: denies easy bruising or bleeding  Endo: denies heat/cold intolerance, excessive sweating  Vascular: denies LE edema    Vital Signs Last 24 Hrs  T(C): 37.1 (25 Nov 2024 15:59), Max: 37.1 (25 Nov 2024 10:43)  T(F): 98.7 (25 Nov 2024 15:59), Max: 98.8 (25 Nov 2024 10:43)  HR: 86 (25 Nov 2024 15:59) (83 - 92)  BP: 156/94 (25 Nov 2024 15:59) (123/84 - 156/94)  BP(mean): --  RR: 20 (25 Nov 2024 15:59) (20 - 20)  SpO2: 97% (25 Nov 2024 15:59) (97% - 100%)    Parameters below as of 25 Nov 2024 15:59  Patient On (Oxygen Delivery Method): room air                              12.5   8.94  )-----------( 317      ( 25 Nov 2024 11:49 )             38.1    11-25    140  |  105  |  11  ----------------------------<  97  4.0   |  22  |  0.52    Ca    9.3      25 Nov 2024 11:49  Mg     2.2     11-25    TPro  7.6  /  Alb  4.2  /  TBili  0.3  /  DBili  x   /  AST  16  /  ALT  17  /  AlkPhos  46  11-25   PT/INR - ( 25 Nov 2024 15:52 )   PT: 12.1 sec;   INR: 1.05 ratio         PTT - ( 25 Nov 2024 15:52 )  PTT:34.4 sec    PHYSICAL EXAM:  Gen: NAD  Skin: No rashes, bruises, or lesions  Head: Normocephalic, Atraumatic  Face: no edema, erythema, or fluctuance. Parotid glands soft without mass  Eyes: no scleral injection  Nose: Nares bilaterally patent, no discharge  Mouth: No Stridor / Drooling / Trismus.  Mucosa moist, tongue/uvula midline, oropharynx clear  Neck: Large thyroid cyst on anterior neck with mild TTP, no overlying erythema, no induration. Flat, supple, no lymphadenopathy, trachea midline  Lymphatic: No lymphadenopathy  Resp: breathing easily, no stridor  CV: no peripheral edema/cyanosis  GI: nondistended   Peripheral vascular: no JVD or edema  Neuro: facial nerve intact, no facial droop          Flexible Laryngoscopy: (Scope #2 used)  Reason for Laryngoscopy:   Patient was unable to cooperate with mirror.    Pachydermia c/w LPRD. Nasopharynx, oropharynx, and hypopharynx clear, no bleeding. Tongue base, posterior pharyngeal wall, vallecula, epiglottis, and subglottis appear normal. No erythema, edema, pooling of secretions, masses or lesions. Airway patent, no foreign body visualized. No glottic/supraglottic edema. True vocal cords, arytenoids, vestibular folds, ventricles, pyriform sinuses, and aryepiglottic folds appear normal bilaterally. Vocal cords mobile with good contact b/l.             IMAGING/ADDITIONAL STUDIES:   < from: US Thyroid + Parathyroid (11.25.24 @ 15:09) >  IMPRESSION:  1. Bilateral thyroid gland nodules, as detailed above. There is interval   increase in size and complexity of a left thyroid lobe complex mixed   solid cystic hypoechoic nodule which now measures 4.2 x 2.8 x 3.7 cm,   TIRADS 3. This contains solid components with arterial flow as well as   increased complexity of the cystic component. Low-level echoes within the   cystic component may be due to interval hemorrhage into this lesion.   Further evaluation with fine-needle aspiration biopsy under ultrasound   guidance is recommended.  2. Interval increase in size of 1.2 cm solid, mildly hypoechoic nodule   within the posterior mid to lower pole of the right thyroid lobe, TI RADS   4. Further assessment with FNA of this nodule may also be considered.  3. Internal echoes are noted within the left common carotid artery, with   theappearance of low velocity/sluggish flow on real-time imaging. The   left common carotid artery is demonstrated to be grossly patent on this   examination but otherwise not assessed. The significance of this finding   is uncertain. For further evaluation, dedicated carotid Doppler   ultrasound is advised.    TI-RAD 4: Moderately suspicious (FNA if > 1.5 cm, Follow if > 1 cm)  __________________________________  ACR Thyroid Imaging, Reporting and Data System (TI-RADS): White Paper of   the ACR TI-RADS Committee. J Am Rachel Radiol 2017;14:587-595.    TI-RAD 1: Benign (No FNA)  TI-RAD 2: Not suspicious (No FNA)  TI-RAD 3: Mildly suspicious (FNA if > 2.5 cm, Follow if >1.5 cm)  TI-RAD 4: Moderately suspicious (FNA if > 1.5 cm, Follow if > 1 cm)  TI-RAD 5: Highly suspicious (FNA if > 1 cm, Follow if > 0.5 cm)    Findings were discussed with Dr. Christopher Verde 11/25/2024 4:45 PM by Dr. Falcon with read back confirmation.    --- End of Report ---    < end of copied text >          < from: US Thyroid + Parathyroid (09.29.23 @ 16:20) >  IMPRESSION:    Bilateral thyroid nodules.    Dominant predominantly cystic left thyroid nodule with 1 cm isoechoic   nodule component. Consider follow-up ultrasound in one year.    TI-RAD 2: Not suspicious (No FNA)  __________________________________  ACR Thyroid Imaging, Reporting and Data System (TI-RADS): White Paper of   the ACR TI-RADS Committee. J Am Rachel Radiol 2017;14:587-595.    TI-RAD 1: Benign (No FNA)  TI-RAD 2: Not suspicious (No FNA)  TI-RAD 3: Mildly suspicious (FNA if > 2.5 cm, Follow if >1.5 cm)  TI-RAD 4: Moderately suspicious (FNA if > 1.5 cm, Follow if > 1 cm)  TI-RAD 5: Highly suspicious (FNA if > 1 cm, Follow if > 0.5 cm)        --- End of Report ---    < end of copied text >   CC: Thyroid nodules found on US    HPI: 40 y/o Female, presenting for palpitations and neck swelling. Patient reports that she has a similar presentation 2 years ago (treated at [facility]) was related with hyperthyroidism and was treated on an unknown medication for few days and it resolved. Symptoms returned over the past week. Associated with dysphagia and odynophagia x1 week. ENT consulted for further evaluation. Thyroid ultrasound performed in the ED showed " Bilateral thyroid gland nodules, as detailed above. There is interval increase in size and complexity of a left thyroid lobe complex mixed solid cystic hypoechoic nodule which now measures 4.2 x 2.8 x 3.7 cm, TIRADS 3. This contains solid components with arterial flow as well as increased complexity of the cystic component. Low-level echoes within the cystic component may be due to interval hemorrhage into this lesion. Further evaluation with fine-needle aspiration biopsy under ultrasound guidance is recommended. 2. Interval increase in size of 1.2 cm solid, mildly hypoechoic nodule within the posterior mid to lower pole of the right thyroid lobe, TI RADS 4. Further assessment with FNA of this nodule may also be considered. 3. Internal echoes are noted within the left common carotid artery, with the appearance of low velocity/sluggish flow on real-time imaging. The left common carotid artery is demonstrated to be grossly patent on this examination but otherwise not assessed. The significance of this finding   is uncertain. For further evaluation, dedicated carotid Doppler ultrasound is advised." Denies SOB, recent changes in voice quality, inability to tolerate secretions, nasal congestion.         PAST MEDICAL & SURGICAL HISTORY:    Allergies    No Known Allergies    Intolerances      MEDICATIONS  (STANDING):  sodium chloride 0.9%. 1000 milliLiter(s) (100 mL/Hr) IV Continuous <Continuous>    MEDICATIONS  (PRN):      Social History: No pertinent SHx    Family history: No pertinent FHx    ROS:   ENT: all negative except as noted in HPI   CV: denies palpitations  Pulm: denies SOB, cough, hemoptysis  GI: denies indigestion, n/v  : denies pertinent urinary symptoms, urgency  Neuro: denies numbness/tingling, loss of sensation  Psych: denies anxiety  MS: denies muscle weakness, instability  Heme: denies easy bruising or bleeding  Endo: denies heat/cold intolerance, excessive sweating  Vascular: denies LE edema    Vital Signs Last 24 Hrs  T(C): 37.1 (25 Nov 2024 15:59), Max: 37.1 (25 Nov 2024 10:43)  T(F): 98.7 (25 Nov 2024 15:59), Max: 98.8 (25 Nov 2024 10:43)  HR: 86 (25 Nov 2024 15:59) (83 - 92)  BP: 156/94 (25 Nov 2024 15:59) (123/84 - 156/94)  BP(mean): --  RR: 20 (25 Nov 2024 15:59) (20 - 20)  SpO2: 97% (25 Nov 2024 15:59) (97% - 100%)    Parameters below as of 25 Nov 2024 15:59  Patient On (Oxygen Delivery Method): room air                              12.5   8.94  )-----------( 317      ( 25 Nov 2024 11:49 )             38.1    11-25    140  |  105  |  11  ----------------------------<  97  4.0   |  22  |  0.52    Ca    9.3      25 Nov 2024 11:49  Mg     2.2     11-25    TPro  7.6  /  Alb  4.2  /  TBili  0.3  /  DBili  x   /  AST  16  /  ALT  17  /  AlkPhos  46  11-25   PT/INR - ( 25 Nov 2024 15:52 )   PT: 12.1 sec;   INR: 1.05 ratio         PTT - ( 25 Nov 2024 15:52 )  PTT:34.4 sec    PHYSICAL EXAM:  Gen: NAD, resting comfortably  Skin: No rashes, bruises, or lesions  Head: Normocephalic, Atraumatic  Face: no edema, erythema, or fluctuance. Parotid glands soft without mass  Eyes: no scleral injection  Nose: Nares bilaterally patent, no discharge  Mouth: No Stridor / Drooling / Trismus. Mucosa moist, tongue/uvula midline, oropharynx clear  Neck: +Large thyroid cyst on anterior neck with mild TTP, no overlying erythema, no induration. Flat, supple, no lymphadenopathy, trachea midline  Lymphatic: No lymphadenopathy  Resp: breathing easily, no stridor  CV: no peripheral edema/cyanosis  GI: nondistended   Peripheral vascular: no JVD or edema  Neuro: facial nerve intact, no facial droop          Flexible Laryngoscopy: (Scope #2 used)  Reason for Laryngoscopy: Dysphagia  Patient was unable to cooperate with mirror.    Pachydermia c/w LPRD. Nasopharynx, oropharynx, and hypopharynx clear, no bleeding. Tongue base, posterior pharyngeal wall, vallecula, epiglottis, and subglottis appear normal. No erythema, edema, pooling of secretions, masses or lesions. Airway patent, no foreign body visualized. No glottic/supraglottic edema. True vocal cords, arytenoids, vestibular folds, ventricles, pyriform sinuses, and aryepiglottic folds appear normal bilaterally. Vocal cords mobile with good contact b/l.         IMAGING/ADDITIONAL STUDIES:   < from: US Thyroid + Parathyroid (11.25.24 @ 15:09) >  IMPRESSION:  1. Bilateral thyroid gland nodules, as detailed above. There is interval   increase in size and complexity of a left thyroid lobe complex mixed   solid cystic hypoechoic nodule which now measures 4.2 x 2.8 x 3.7 cm,   TIRADS 3. This contains solid components with arterial flow as well as   increased complexity of the cystic component. Low-level echoes within the   cystic component may be due to interval hemorrhage into this lesion.   Further evaluation with fine-needle aspiration biopsy under ultrasound   guidance is recommended.  2. Interval increase in size of 1.2 cm solid, mildly hypoechoic nodule   within the posterior mid to lower pole of the right thyroid lobe, TI RADS   4. Further assessment with FNA of this nodule may also be considered.  3. Internal echoes are noted within the left common carotid artery, with   theappearance of low velocity/sluggish flow on real-time imaging. The   left common carotid artery is demonstrated to be grossly patent on this   examination but otherwise not assessed. The significance of this finding   is uncertain. For further evaluation, dedicated carotid Doppler   ultrasound is advised.    TI-RAD 4: Moderately suspicious (FNA if > 1.5 cm, Follow if > 1 cm)  __________________________________  ACR Thyroid Imaging, Reporting and Data System (TI-RADS): White Paper of   the ACR TI-RADS Committee. J Am Rachel Radiol 2017;14:[id number].    TI-RAD 1: Benign (No FNA)  TI-RAD 2: Not suspicious (No FNA)  TI-RAD 3: Mildly suspicious (FNA if > 2.5 cm, Follow if >1.5 cm)  TI-RAD 4: Moderately suspicious (FNA if > 1.5 cm, Follow if > 1 cm)  TI-RAD 5: Highly suspicious (FNA if > 1 cm, Follow if > 0.5 cm)    Findings were discussed with Dr. Christopher Verde 11/25/2024 4:45 PM by Dr. Falcon with read back confirmation.    --- End of Report ---    < end of copied text >          < from: US Thyroid + Parathyroid (09.29.23 @ 16:20) >  IMPRESSION:    Bilateral thyroid nodules.    Dominant predominantly cystic left thyroid nodule with 1 cm isoechoic   nodule component. Consider follow-up ultrasound in one year.    TI-RAD 2: Not suspicious (No FNA)  __________________________________  ACR Thyroid Imaging, Reporting and Data System (TI-RADS): White Paper of   the ACR TI-RADS Committee. J Am Rachel Radiol 2017;14:587-595.    TI-RAD 1: Benign (No FNA)  TI-RAD 2: Not suspicious (No FNA)  TI-RAD 3: Mildly suspicious (FNA if > 2.5 cm, Follow if >1.5 cm)  TI-RAD 4: Moderately suspicious (FNA if > 1.5 cm, Follow if > 1 cm)  TI-RAD 5: Highly suspicious (FNA if > 1 cm, Follow if > 0.5 cm)        --- End of Report ---    < end of copied text >   CC: Thyroid nodules found on US    HPI: 38 y/o Female, presenting for palpitations and neck swelling. Patient reports that she has a similar presentation 2 years ago (treated at [facility]) was related with hyperthyroidism and was treated on an unknown medication for few days and it resolved. Symptoms returned over the past week. Associated with dysphagia and odynophagia x1 week. ENT consulted for further evaluation. Thyroid ultrasound performed in the ED showed " Bilateral thyroid gland nodules, as detailed above. There is interval increase in size and complexity of a left thyroid lobe complex mixed solid cystic hypoechoic nodule which now measures 4.2 x 2.8 x 3.7 cm, TIRADS 3. This contains solid components with arterial flow as well as increased complexity of the cystic component. Low-level echoes within the cystic component may be due to interval hemorrhage into this lesion. Further evaluation with fine-needle aspiration biopsy under ultrasound guidance is recommended. 2. Interval increase in size of 1.2 cm solid, mildly hypoechoic nodule within the posterior mid to lower pole of the right thyroid lobe, TI RADS 4. Further assessment with FNA of this nodule may also be considered. 3. Internal echoes are noted within the left common carotid artery, with the appearance of low velocity/sluggish flow on real-time imaging. The left common carotid artery is demonstrated to be grossly patent on this examination but otherwise not assessed. The significance of this finding is uncertain. For further evaluation, dedicated carotid Doppler ultrasound is advised." Denies SOB, recent changes in voice quality, inability to tolerate secretions, nasal congestion.         PAST MEDICAL & SURGICAL HISTORY:    Allergies    No Known Allergies    Intolerances      MEDICATIONS  (STANDING):  sodium chloride 0.9%. 1000 milliLiter(s) (100 mL/Hr) IV Continuous <Continuous>    MEDICATIONS  (PRN):      Social History: No pertinent SHx    Family history: No pertinent FHx    ROS:   ENT: all negative except as noted in HPI   CV: denies palpitations  Pulm: denies SOB, cough, hemoptysis  GI: denies indigestion, n/v  : denies pertinent urinary symptoms, urgency  Neuro: denies numbness/tingling, loss of sensation  Psych: denies anxiety  MS: denies muscle weakness, instability  Heme: denies easy bruising or bleeding  Endo: denies heat/cold intolerance, excessive sweating  Vascular: denies LE edema    Vital Signs Last 24 Hrs  T(C): 37.1 (25 Nov 2024 15:59), Max: 37.1 (25 Nov 2024 10:43)  T(F): 98.7 (25 Nov 2024 15:59), Max: 98.8 (25 Nov 2024 10:43)  HR: 86 (25 Nov 2024 15:59) (83 - 92)  BP: 156/94 (25 Nov 2024 15:59) (123/84 - 156/94)  BP(mean): --  RR: 20 (25 Nov 2024 15:59) (20 - 20)  SpO2: 97% (25 Nov 2024 15:59) (97% - 100%)    Parameters below as of 25 Nov 2024 15:59  Patient On (Oxygen Delivery Method): room air                              12.5   8.94  )-----------( 317      ( 25 Nov 2024 11:49 )             38.1    11-25    140  |  105  |  11  ----------------------------<  97  4.0   |  22  |  0.52    Ca    9.3      25 Nov 2024 11:49  Mg     2.2     11-25    TPro  7.6  /  Alb  4.2  /  TBili  0.3  /  DBili  x   /  AST  16  /  ALT  17  /  AlkPhos  46  11-25   PT/INR - ( 25 Nov 2024 15:52 )   PT: 12.1 sec;   INR: 1.05 ratio         PTT - ( 25 Nov 2024 15:52 )  PTT:34.4 sec    PHYSICAL EXAM:  Gen: NAD, resting comfortably  Skin: No rashes, bruises, or lesions  Head: Normocephalic, Atraumatic  Face: no edema, erythema, or fluctuance. Parotid glands soft without mass  Eyes: no scleral injection  Nose: Nares bilaterally patent, no discharge  Mouth: No Stridor / Drooling / Trismus. Mucosa moist, tongue/uvula midline, oropharynx clear  Neck: +Large thyroid cyst on anterior neck with mild TTP, no overlying erythema, no induration. Flat, supple, no lymphadenopathy, trachea midline  Lymphatic: No lymphadenopathy  Resp: breathing easily, no stridor  CV: no peripheral edema/cyanosis  GI: nondistended   Peripheral vascular: no JVD or edema  Neuro: facial nerve intact, no facial droop          Flexible Laryngoscopy: (Scope #2 used)  Reason for Laryngoscopy: Dysphagia  Patient was unable to cooperate with mirror.    Pachydermia c/w LPRD. Nasopharynx, oropharynx, and hypopharynx clear, no bleeding. Tongue base, posterior pharyngeal wall, vallecula, epiglottis, and subglottis appear normal. No erythema, edema, pooling of secretions, masses or lesions. Airway patent, no foreign body visualized. No glottic/supraglottic edema. True vocal cords, arytenoids, vestibular folds, ventricles, pyriform sinuses, and aryepiglottic folds appear normal bilaterally. Vocal cords mobile with good contact b/l.         IMAGING/ADDITIONAL STUDIES:   < from: US Thyroid + Parathyroid (11.25.24 @ 15:09) >  IMPRESSION:  1. Bilateral thyroid gland nodules, as detailed above. There is interval   increase in size and complexity of a left thyroid lobe complex mixed   solid cystic hypoechoic nodule which now measures 4.2 x 2.8 x 3.7 cm,   TIRADS 3. This contains solid components with arterial flow as well as   increased complexity of the cystic component. Low-level echoes within the   cystic component may be due to interval hemorrhage into this lesion.   Further evaluation with fine-needle aspiration biopsy under ultrasound   guidance is recommended.  2. Interval increase in size of 1.2 cm solid, mildly hypoechoic nodule   within the posterior mid to lower pole of the right thyroid lobe, TI RADS   4. Further assessment with FNA of this nodule may also be considered.  3. Internal echoes are noted within the left common carotid artery, with   theappearance of low velocity/sluggish flow on real-time imaging. The   left common carotid artery is demonstrated to be grossly patent on this   examination but otherwise not assessed. The significance of this finding   is uncertain. For further evaluation, dedicated carotid Doppler   ultrasound is advised.    TI-RAD 4: Moderately suspicious (FNA if > 1.5 cm, Follow if > 1 cm)  __________________________________  ACR Thyroid Imaging, Reporting and Data System (TI-RADS): White Paper of   the ACR TI-RADS Committee. J Am Rachel Radiol 2017;14:[id number].    TI-RAD 1: Benign (No FNA)  TI-RAD 2: Not suspicious (No FNA)  TI-RAD 3: Mildly suspicious (FNA if > 2.5 cm, Follow if >1.5 cm)  TI-RAD 4: Moderately suspicious (FNA if > 1.5 cm, Follow if > 1 cm)  TI-RAD 5: Highly suspicious (FNA if > 1 cm, Follow if > 0.5 cm)    Findings were discussed with Dr. Christopher Vered 11/25/2024 4:45 PM by Dr. Falcon with read back confirmation.    --- End of Report ---    < end of copied text >          < from: US Thyroid + Parathyroid (09.29.23 @ 16:20) >  IMPRESSION:    Bilateral thyroid nodules.    Dominant predominantly cystic left thyroid nodule with 1 cm isoechoic   nodule component. Consider follow-up ultrasound in one year.    TI-RAD 2: Not suspicious (No FNA)  __________________________________  ACR Thyroid Imaging, Reporting and Data System (TI-RADS): White Paper of   the ACR TI-RADS Committee. J Am Rachel Radiol 2017;14:[id number].    TI-RAD 1: Benign (No FNA)  TI-RAD 2: Not suspicious (No FNA)  TI-RAD 3: Mildly suspicious (FNA if > 2.5 cm, Follow if >1.5 cm)  TI-RAD 4: Moderately suspicious (FNA if > 1.5 cm, Follow if > 1 cm)  TI-RAD 5: Highly suspicious (FNA if > 1 cm, Follow if > 0.5 cm)        --- End of Report ---    < end of copied text >   CC: Thyroid nodules found on US    HPI: 40 y/o Female, presenting for palpitations and neck swelling. Patient reports that she has a similar presentation 2 years ago (treated at [facility]) was related with hyperthyroidism and was treated on an unknown medication for few days and it resolved. Symptoms returned over the past week. Associated with dysphagia and odynophagia x1 week. ENT consulted for further evaluation. Pt stated that prior to 1 week ago, neck swelling was not present. Thyroid ultrasound performed in the ED showed " Bilateral thyroid gland nodules, as detailed above. There is interval increase in size and complexity of a left thyroid lobe complex mixed solid cystic hypoechoic nodule which now measures 4.2 x 2.8 x 3.7 cm, TIRADS 3. This contains solid components with arterial flow as well as increased complexity of the cystic component. Low-level echoes within the cystic component may be due to interval hemorrhage into this lesion. Further evaluation with fine-needle aspiration biopsy under ultrasound guidance is recommended. 2. Interval increase in size of 1.2 cm solid, mildly hypoechoic nodule within the posterior mid to lower pole of the right thyroid lobe, TI RADS 4. Further assessment with FNA of this nodule may also be considered. 3. Internal echoes are noted within the left common carotid artery, with the appearance of low velocity/sluggish flow on real-time imaging. The left common carotid artery is demonstrated to be grossly patent on this examination but otherwise not assessed. The significance of this finding is uncertain. For further evaluation, dedicated carotid Doppler ultrasound is advised." Denies SOB, recent changes in voice quality, inability to tolerate secretions, nasal congestion, heat or cold intolerance, excessive sweating, restlessness. Pt admits to about ~20lb weight loss but is intentional (walks around 7 miles a day).         PAST MEDICAL & SURGICAL HISTORY:    Allergies    No Known Allergies    Intolerances      MEDICATIONS  (STANDING):  sodium chloride 0.9%. 1000 milliLiter(s) (100 mL/Hr) IV Continuous <Continuous>    MEDICATIONS  (PRN):      Social History: No pertinent SHx    Family history: No pertinent FHx    ROS:   ENT: all negative except as noted in HPI   CV: denies palpitations  Pulm: denies SOB, cough, hemoptysis  GI: denies indigestion, n/v  : denies pertinent urinary symptoms, urgency  Neuro: denies numbness/tingling, loss of sensation  Psych: denies anxiety  MS: denies muscle weakness, instability  Heme: denies easy bruising or bleeding  Endo: denies heat/cold intolerance, excessive sweating  Vascular: denies LE edema    Vital Signs Last 24 Hrs  T(C): 37.1 (25 Nov 2024 15:59), Max: 37.1 (25 Nov 2024 10:43)  T(F): 98.7 (25 Nov 2024 15:59), Max: 98.8 (25 Nov 2024 10:43)  HR: 86 (25 Nov 2024 15:59) (83 - 92)  BP: 156/94 (25 Nov 2024 15:59) (123/84 - 156/94)  BP(mean): --  RR: 20 (25 Nov 2024 15:59) (20 - 20)  SpO2: 97% (25 Nov 2024 15:59) (97% - 100%)    Parameters below as of 25 Nov 2024 15:59  Patient On (Oxygen Delivery Method): room air                              12.5   8.94  )-----------( 317      ( 25 Nov 2024 11:49 )             38.1    11-25    140  |  105  |  11  ----------------------------<  97  4.0   |  22  |  0.52    Ca    9.3      25 Nov 2024 11:49  Mg     2.2     11-25    TPro  7.6  /  Alb  4.2  /  TBili  0.3  /  DBili  x   /  AST  16  /  ALT  17  /  AlkPhos  46  11-25   PT/INR - ( 25 Nov 2024 15:52 )   PT: 12.1 sec;   INR: 1.05 ratio         PTT - ( 25 Nov 2024 15:52 )  PTT:34.4 sec    PHYSICAL EXAM:  Gen: NAD, resting comfortably  Skin: No rashes, bruises, or lesions  Head: Normocephalic, Atraumatic  Face: no edema, erythema, or fluctuance. Parotid glands soft without mass  Eyes: no scleral injection  Nose: Nares bilaterally patent, no discharge  Mouth: No Stridor / Drooling / Trismus. Mucosa moist, tongue/uvula midline, oropharynx clear  Neck: +Large thyroid cyst on anterior neck with mild TTP, no overlying erythema, no induration. Flat, supple, no lymphadenopathy, trachea midline  Lymphatic: No lymphadenopathy  Resp: breathing easily, no stridor  CV: no peripheral edema/cyanosis  GI: nondistended   Peripheral vascular: no JVD or edema  Neuro: facial nerve intact, no facial droop          Flexible Laryngoscopy: (Scope #2 used)  Reason for Laryngoscopy: Dysphagia  Patient was unable to cooperate with mirror.    Pachydermia c/w LPRD. Nasopharynx, oropharynx, and hypopharynx clear, no bleeding. Tongue base, posterior pharyngeal wall, vallecula, epiglottis, and subglottis appear normal. No erythema, edema, pooling of secretions, masses or lesions. Airway patent, no foreign body visualized. No glottic/supraglottic edema. True vocal cords, arytenoids, vestibular folds, ventricles, pyriform sinuses, and aryepiglottic folds appear normal bilaterally. Vocal cords mobile with good contact b/l.         IMAGING/ADDITIONAL STUDIES:   < from: US Thyroid + Parathyroid (11.25.24 @ 15:09) >  IMPRESSION:  1. Bilateral thyroid gland nodules, as detailed above. There is interval   increase in size and complexity of a left thyroid lobe complex mixed   solid cystic hypoechoic nodule which now measures 4.2 x 2.8 x 3.7 cm,   TIRADS 3. This contains solid components with arterial flow as well as   increased complexity of the cystic component. Low-level echoes within the   cystic component may be due to interval hemorrhage into this lesion.   Further evaluation with fine-needle aspiration biopsy under ultrasound   guidance is recommended.  2. Interval increase in size of 1.2 cm solid, mildly hypoechoic nodule   within the posterior mid to lower pole of the right thyroid lobe, TI RADS   4. Further assessment with FNA of this nodule may also be considered.  3. Internal echoes are noted within the left common carotid artery, with   theappearance of low velocity/sluggish flow on real-time imaging. The   left common carotid artery is demonstrated to be grossly patent on this   examination but otherwise not assessed. The significance of this finding   is uncertain. For further evaluation, dedicated carotid Doppler   ultrasound is advised.    TI-RAD 4: Moderately suspicious (FNA if > 1.5 cm, Follow if > 1 cm)  __________________________________  ACR Thyroid Imaging, Reporting and Data System (TI-RADS): White Paper of   the ACR TI-RADS Committee. J Am Rachel Radiol 2017;14:587-595.    TI-RAD 1: Benign (No FNA)  TI-RAD 2: Not suspicious (No FNA)  TI-RAD 3: Mildly suspicious (FNA if > 2.5 cm, Follow if >1.5 cm)  TI-RAD 4: Moderately suspicious (FNA if > 1.5 cm, Follow if > 1 cm)  TI-RAD 5: Highly suspicious (FNA if > 1 cm, Follow if > 0.5 cm)    Findings were discussed with Dr. Christopher Verde 11/25/2024 4:45 PM by Dr. Falcon with read back confirmation.    --- End of Report ---    < end of copied text >          < from: US Thyroid + Parathyroid (09.29.23 @ 16:20) >  IMPRESSION:    Bilateral thyroid nodules.    Dominant predominantly cystic left thyroid nodule with 1 cm isoechoic   nodule component. Consider follow-up ultrasound in one year.    TI-RAD 2: Not suspicious (No FNA)  __________________________________  ACR Thyroid Imaging, Reporting and Data System (TI-RADS): White Paper of   the ACR TI-RADS Committee. J Am Rachel Radiol 2017;14:587-595.    TI-RAD 1: Benign (No FNA)  TI-RAD 2: Not suspicious (No FNA)  TI-RAD 3: Mildly suspicious (FNA if > 2.5 cm, Follow if >1.5 cm)  TI-RAD 4: Moderately suspicious (FNA if > 1.5 cm, Follow if > 1 cm)  TI-RAD 5: Highly suspicious (FNA if > 1 cm, Follow if > 0.5 cm)        --- End of Report ---    < end of copied text >

## 2024-11-25 NOTE — ED CDU PROVIDER INITIAL DAY NOTE - OBJECTIVE STATEMENT
39-year-old female presenting for palpitations and neck swelling.  Patient reports that she has a similar presentation 2 years ago (treated at [facility]) was related with hyperthyroidism and was treated on an unknown medication for few days and it resolved.  Symptoms returned over the past week.  Associated with pain and anterior neck swelling, intermittent palpitations.  Denies any recent illnesses does endorse a 17 pound weight loss over the past few months however was intentional with exercise and diet. Endorses regular menstrual periods.     In ED, labs unremarkable, US showed___________, endocrine consulted for evaluation of thyroid cysts, placed in CDU for endo c/s and IR drainage of thyroid cyst.

## 2024-11-25 NOTE — ED PROVIDER NOTE - CLINICAL SUMMARY MEDICAL DECISION MAKING FREE TEXT BOX
39-year-old female presenting with worsening goiter, palpitations over the past week.  Denies any recent infectious symptoms reports she has had episode of this in the past however most recent thyroid ultrasound was unremarkable.  Will obtain labs, EKG, and contact with her endocrinologist.

## 2024-11-25 NOTE — CONSULT NOTE ADULT - SUBJECTIVE AND OBJECTIVE BOX
Interventional Radiology    Evaluate for Procedure:     HPI: 39-year-old female presenting for palpitations and neck swelling.  Patient reports that she has a similar presentation 2 years ago (treated at [facility]) was related with hyperthyroidism and was treated on an unknown medication for few days and it resolved.  Symptoms returned over the past week.  Associated with pain and anterior neck swelling, intermittent palpitations.  Denies any recent illnesses does endorse a 17 pound weight loss over the past few months however was intentional with exercise and diet. IR consulted for thyroid cyst aspiration.     Allergies: No Known Allergies    Medications (Abx/Cardiac/Anticoagulation/Blood Products)  Reviewed     Data:    50.3  T(C): 37.1  HR: 86  BP: 156/94  RR: 20  SpO2: 97%    -WBC 8.94 / HgB 12.5 / Hct 38.1 / Plt 317  -Na 140 / Cl 105 / BUN 11 / Glucose 97  -K 4.0 / CO2 22 / Cr 0.52  -ALT 17 / Alk Phos 46 / T.Bili 0.3  -INR 1.05 / PTT 34.4    Radiology:     Assessment/Plan:   39-year-old female presenting for palpitations and neck swelling.  Patient reports that she has a similar presentation 2 years ago (treated at [facility]) was related with hyperthyroidism and was treated on an unknown medication for few days and it resolved.  Symptoms returned over the past week.  Associated with pain and anterior neck swelling, intermittent palpitations.  Denies any recent illnesses does endorse a 17 pound weight loss over the past few months however was intentional with exercise and diet. IR consulted for thyroid cyst aspiration.       - Obtain CT Neck  - Consult ENT   - IR to follow   - d/w primary team Interventional Radiology    Evaluate for Procedure:     HPI: 39-year-old female presenting for palpitations and neck swelling.  Patient reports that she has a similar presentation 2 years ago (treated at [facility]) was related with hyperthyroidism and was treated on an unknown medication for few days and it resolved.  Symptoms returned over the past week.  Associated with pain and anterior neck swelling, intermittent palpitations.  Denies any recent illnesses does endorse a 17 pound weight loss over the past few months however was intentional with exercise and diet. IR consulted for thyroid cyst aspiration.     Allergies: No Known Allergies    Medications (Abx/Cardiac/Anticoagulation/Blood Products)  Reviewed     Data:    50.3  T(C): 37.1  HR: 86  BP: 156/94  RR: 20  SpO2: 97%    -WBC 8.94 / HgB 12.5 / Hct 38.1 / Plt 317  -Na 140 / Cl 105 / BUN 11 / Glucose 97  -K 4.0 / CO2 22 / Cr 0.52  -ALT 17 / Alk Phos 46 / T.Bili 0.3  -INR 1.05 / PTT 34.4    Radiology:     Assessment/Plan:   39-year-old female presenting for palpitations and neck swelling.  Patient reports that she has a similar presentation 2 years ago (treated at [facility]) was related with hyperthyroidism and was treated on an unknown medication for few days and it resolved.  Symptoms returned over the past week.  Associated with pain and anterior neck swelling, intermittent palpitations.  Denies any recent illnesses does endorse a 17 pound weight loss over the past few months however was intentional with exercise and diet. IR consulted for thyroid cyst aspiration.       - Obtain CT Neck  - Consult ENT   - IR to follow   - d/w primary team    case and imaging reviewed with Dr. Griffith

## 2024-11-25 NOTE — CONSULT NOTE ADULT - PROBLEM SELECTOR RECOMMENDATION 9
- Recommend CT Neck with IV contrast  - Recommend outpt f/u with Head and Neck team, Dr. Giordano, Dr. Martinez, Dr. Resendez, Dr. Grewal, call [phone] to make an appointment. - Recommend CT Neck with IV contrast for further evaluation of thyroid cyst - Recommend CT Neck with IV contrast for further evaluation of thyroid cyst  - Recommend outpt f/u with [facility] Head and Neck team, Dr. Giordano, Dr. Martinez, Dr. Resendez, Dr. Grewal, call [phone] to make an appointment. - Recommend CT Neck with IV contrast for further evaluation of thyroid cysts  - Further recommendations to follow pending CT Neck findings - Recommend trial of PPI  - Avoid common triggers, which includes but is not limited to, spicy/acidic foods, coffee, chocolate, mint, etc.  - Avoid eating or drinking 2-3 hours before bedtime and lying down  - Elevation of HOB or use an extra pillow for sleep

## 2024-11-25 NOTE — ED CDU PROVIDER DISPOSITION NOTE - ATTENDING APP SHARED VISIT CONTRIBUTION OF CARE
I have personally performed a face to face medical and diagnostic evaluation of the patient. I have discussed with and reviewed the Resident's and/or ACP's and/or Medical/PA/NP student's note and agree with the History, ROS, Physical Exam and MDM unless otherwise indicated. A brief summary of my personal evaluation and impression can be found below.     39F PMH hyperthyroidism presenting to ED with a few days of worsening palpitations and neck swelling/pain, placed in CDU for ENT eval. Pt CT showed Dominant left thyroid nodule with mild tracheal narrowing and rightward deviation. US showed  Bilateral thyroid gland nodules, left thyroid lobe complex mixed solid cystic hypoechoic nodule which now measures 4.2 x 2.8 x 3.7 cm, TIRADS 3. On my evaluation, pt reports persistent neck pain, denies drooling or difficulty tolerating secretions. pt able to tolerate PO. No chest pain or sob. PE shows L thyroid nodule ttp. No stridor. Lungs cta. RRR. Well appearing, nontoxic. Moving all extremities. Pt seen by ENT who recommended FNA of thyroid nodule, IR unable to perform until the end of the week, pt offered admission but states would like to FU outpt. No acute intervention at this time as per ENT. Pt hemoglobin stable, labs without emergent findings. Pt stable for dc with return precautions.

## 2024-11-26 VITALS
RESPIRATION RATE: 16 BRPM | HEART RATE: 77 BPM | SYSTOLIC BLOOD PRESSURE: 147 MMHG | TEMPERATURE: 98 F | DIASTOLIC BLOOD PRESSURE: 84 MMHG | OXYGEN SATURATION: 96 %

## 2024-11-26 DIAGNOSIS — E04.1 NONTOXIC SINGLE THYROID NODULE: ICD-10-CM

## 2024-11-26 LAB
BASOPHILS # BLD AUTO: 0.04 K/UL — SIGNIFICANT CHANGE UP (ref 0–0.2)
BASOPHILS NFR BLD AUTO: 0.4 % — SIGNIFICANT CHANGE UP (ref 0–2)
EOSINOPHIL # BLD AUTO: 0.07 K/UL — SIGNIFICANT CHANGE UP (ref 0–0.5)
EOSINOPHIL NFR BLD AUTO: 0.7 % — SIGNIFICANT CHANGE UP (ref 0–6)
HCT VFR BLD CALC: 39.7 % — SIGNIFICANT CHANGE UP (ref 34.5–45)
HGB BLD-MCNC: 12.7 G/DL — SIGNIFICANT CHANGE UP (ref 11.5–15.5)
IMM GRANULOCYTES NFR BLD AUTO: 0.9 % — SIGNIFICANT CHANGE UP (ref 0–0.9)
LYMPHOCYTES # BLD AUTO: 1.48 K/UL — SIGNIFICANT CHANGE UP (ref 1–3.3)
LYMPHOCYTES # BLD AUTO: 15.2 % — SIGNIFICANT CHANGE UP (ref 13–44)
MCHC RBC-ENTMCNC: 27.1 PG — SIGNIFICANT CHANGE UP (ref 27–34)
MCHC RBC-ENTMCNC: 32 G/DL — SIGNIFICANT CHANGE UP (ref 32–36)
MCV RBC AUTO: 84.8 FL — SIGNIFICANT CHANGE UP (ref 80–100)
MONOCYTES # BLD AUTO: 0.7 K/UL — SIGNIFICANT CHANGE UP (ref 0–0.9)
MONOCYTES NFR BLD AUTO: 7.2 % — SIGNIFICANT CHANGE UP (ref 2–14)
NEUTROPHILS # BLD AUTO: 7.35 K/UL — SIGNIFICANT CHANGE UP (ref 1.8–7.4)
NEUTROPHILS NFR BLD AUTO: 75.6 % — SIGNIFICANT CHANGE UP (ref 43–77)
NRBC # BLD: 0 /100 WBCS — SIGNIFICANT CHANGE UP (ref 0–0)
PLATELET # BLD AUTO: 331 K/UL — SIGNIFICANT CHANGE UP (ref 150–400)
RBC # BLD: 4.68 M/UL — SIGNIFICANT CHANGE UP (ref 3.8–5.2)
RBC # FLD: 13.4 % — SIGNIFICANT CHANGE UP (ref 10.3–14.5)
WBC # BLD: 9.73 K/UL — SIGNIFICANT CHANGE UP (ref 3.8–10.5)
WBC # FLD AUTO: 9.73 K/UL — SIGNIFICANT CHANGE UP (ref 3.8–10.5)

## 2024-11-26 PROCEDURE — 85610 PROTHROMBIN TIME: CPT

## 2024-11-26 PROCEDURE — 84484 ASSAY OF TROPONIN QUANT: CPT

## 2024-11-26 PROCEDURE — 85025 COMPLETE CBC W/AUTO DIFF WBC: CPT

## 2024-11-26 PROCEDURE — 84439 ASSAY OF FREE THYROXINE: CPT

## 2024-11-26 PROCEDURE — 84702 CHORIONIC GONADOTROPIN TEST: CPT

## 2024-11-26 PROCEDURE — 70491 CT SOFT TISSUE NECK W/DYE: CPT | Mod: 26,MC

## 2024-11-26 PROCEDURE — 84443 ASSAY THYROID STIM HORMONE: CPT

## 2024-11-26 PROCEDURE — 93005 ELECTROCARDIOGRAM TRACING: CPT | Mod: XU

## 2024-11-26 PROCEDURE — 83735 ASSAY OF MAGNESIUM: CPT

## 2024-11-26 PROCEDURE — 85730 THROMBOPLASTIN TIME PARTIAL: CPT

## 2024-11-26 PROCEDURE — 70491 CT SOFT TISSUE NECK W/DYE: CPT | Mod: MC

## 2024-11-26 PROCEDURE — 84480 ASSAY TRIIODOTHYRONINE (T3): CPT

## 2024-11-26 PROCEDURE — 76536 US EXAM OF HEAD AND NECK: CPT

## 2024-11-26 PROCEDURE — 31575 DIAGNOSTIC LARYNGOSCOPY: CPT

## 2024-11-26 PROCEDURE — 80053 COMPREHEN METABOLIC PANEL: CPT

## 2024-11-26 PROCEDURE — 36415 COLL VENOUS BLD VENIPUNCTURE: CPT

## 2024-11-26 PROCEDURE — 93880 EXTRACRANIAL BILAT STUDY: CPT

## 2024-11-26 PROCEDURE — 86376 MICROSOMAL ANTIBODY EACH: CPT

## 2024-11-26 PROCEDURE — 99238 HOSP IP/OBS DSCHRG MGMT 30/<: CPT

## 2024-11-26 PROCEDURE — 99284 EMERGENCY DEPT VISIT MOD MDM: CPT | Mod: 25

## 2024-11-26 PROCEDURE — G0378: CPT

## 2024-11-26 PROCEDURE — 96374 THER/PROPH/DIAG INJ IV PUSH: CPT | Mod: XU

## 2024-11-26 RX ORDER — ACETAMINOPHEN 500 MG
750 TABLET ORAL ONCE
Refills: 0 | Status: DISCONTINUED | OUTPATIENT
Start: 2024-11-26 | End: 2024-11-26

## 2024-11-26 RX ORDER — ACETAMINOPHEN 500 MG
1000 TABLET ORAL ONCE
Refills: 0 | Status: COMPLETED | OUTPATIENT
Start: 2024-11-26 | End: 2024-11-26

## 2024-11-26 RX ADMIN — SODIUM CHLORIDE 100 MILLILITER(S): 9 INJECTION, SOLUTION INTRAMUSCULAR; INTRAVENOUS; SUBCUTANEOUS at 08:33

## 2024-11-26 RX ADMIN — Medication 1000 MILLIGRAM(S): at 10:34

## 2024-11-26 RX ADMIN — Medication 400 MILLIGRAM(S): at 10:08

## 2024-11-26 NOTE — CHART NOTE - NSCHARTNOTEFT_GEN_A_CORE
IR consulted for Thyroid cyst biopsy, ENT recommending US FNA biopsy.   - if patient remains in house, US FNA can be done in US on Friday 11/29 per primary team  - if patient is to be discharged prior to Friday, US FNA can be schedule as an outpatient, can call IR booking office at [phone] to schedule.

## 2024-11-26 NOTE — CHART NOTE - NSCHARTNOTEFT_GEN_A_CORE
40 y/o Female, presenting for palpitations and neck swelling. Patient reports that she has a similar presentation 2 years ago (treated at [facility]) was related with hyperthyroidism and was treated on an unknown medication for few days and it resolved. Symptoms returned over the past week. Associated with dysphagia and odynophagia x1 week. ENT consulted for further evaluation. Pt stated that prior to 1 week ago, neck swelling was not present. Thyroid ultrasound performed in the ED showed " Bilateral thyroid gland nodules, as detailed above. There is interval increase in size and complexity of a left thyroid lobe complex mixed solid cystic hypoechoic nodule which now measures 4.2 x 2.8 x 3.7 cm, TIRADS 3. This contains solid components with arterial flow as well as increased complexity of the cystic component. Low-level echoes within the cystic component may be due to interval hemorrhage into this lesion. Further evaluation with fine-needle aspiration biopsy under ultrasound guidance is recommended. 2. Interval increase in size of 1.2 cm solid, mildly hypoechoic nodule within the posterior mid to lower pole of the right thyroid lobe, TI RADS 4. Further assessment with FNA of this nodule may also be considered. 3. Internal echoes are noted within the left common carotid artery, with the appearance of low velocity/sluggish flow on real-time imaging. The left common carotid artery is demonstrated to be grossly patent on this examination but otherwise not assessed. The significance of this finding is uncertain. For further evaluation, dedicated carotid Doppler ultrasound is advised." Pt admits to about ~20lb weight loss but is intentional (walks around 7 miles a day). On exam, +Large thyroid cyst on anterior neck with mild TTP. Flexible laryngoscopy showed Pachydermia c/w LPRD, airway patent.       ADDITIONAL IMAGING:   CT NECK  IMPRESSION:  Dominant left thyroid nodule better evaluated on the recent thyroid ultrasound. Mild tracheal narrowing and rightward deviation.    --- End of Report ---      US THYROID:  IMPRESSION:  1. Bilateral thyroid gland nodules, as detailed above. There is interval increase in size and complexity of a left thyroid lobe complex mixed solid cystic hypoechoic nodule which now measures 4.2 x 2.8 x 3.7 cm, TIRADS 3. This contains solid components with arterial flow as well as increased complexity of the cystic component. Low-level echoes within the cystic component may be due to interval hemorrhage into this lesion. Further evaluation with fine-needle aspiration biopsy under ultrasound guidance is recommended.  2. Interval increase in size of 1.2 cm solid, mildly hypoechoic nodule within the posterior mid to lower pole of the right thyroid lobe, TI RADS 4. Further assessment with FNA of this nodule may also be considered.  3. Internal echoes are noted within the left common carotid artery, with the appearance of low velocity/sluggish flow on real-time imaging. The left common carotid artery is demonstrated to be grossly patent on this examination but otherwise not assessed. The significance of this finding is uncertain. For further evaluation, dedicated carotid Doppler ultrasound is advised.      P:   Thyroid nodule  - F/u US guided FNA  - Pt needs to follow up with [facility] upon discharge, Dr. Giordano, Dr. Martinez, Dr. Resendez, Dr. Grewal, call [phone] to make an appointment  - No acute ENT intervention at this time    LPRD  - PPI  - Avoid common triggers, which includes but is not limited to, spicy/acidic foods, coffee, chocolate, mint, etc.  - Avoid eating or drinking 2-3 hours before bedtime and lying down  - Elevation of HOB or use an extra pillow for sleep

## 2024-11-26 NOTE — ED CDU PROVIDER SUBSEQUENT DAY NOTE - HISTORY
CDU PROGRESS NOTE SPRING KEARNS: No interval change. Resting comfortably denies palpitations, no events on tele. NAD. VSS. carotid doppler resulted without significant stenosis. TFTs resulted completely normal. CT neck performed, results pending. based on results will determine if pt requires IR drainage vs DC

## 2024-11-26 NOTE — ED ADULT NURSE REASSESSMENT NOTE - NS ED NURSE REASSESS COMMENT FT1
Received a 39F aaox4 ambulatory with h/o Goiter, came to ED yesterday for increasing size of the Goiter and c/o pain when swallowing solid foods. Patient on IVF NS 100ml/hr for hydration, pending consult and recommendation. Repeat labs sent pending results. BENJIE ABAD.
Kanwal

## 2024-11-26 NOTE — ED CDU PROVIDER SUBSEQUENT DAY NOTE - ATTENDING APP SHARED VISIT CONTRIBUTION OF CARE
I have personally performed a face to face medical and diagnostic evaluation of the patient. I have discussed with and reviewed the Resident's and/or ACP's and/or Medical/PA/NP student's note and agree with the History, ROS, Physical Exam and MDM unless otherwise indicated. A brief summary of my personal evaluation and impression can be found below.     39-year-old female presenting to the ED with palpitations and neck swelling (history of hyperthyroidism) placed in CDU due to concern for enlarged left thyroid cyst causing small tracheal narrowing.  Patient was seen by ENT, CT showing Dominant left thyroid nodule better evaluated on the recent thyroid   ultrasound. Mild tracheal narrowing and rightward deviation. US showing Bilateral thyroid gland nodules, left thyroid lobe complex mixed solid cystic hypoechoic nodule which now measures 4.2 x 2.8 x 3.7 cm,   TIRADS 3. Low-level echoes within the cystic component may be due to interval hemorrhage into this lesion. On my exam patient reports persistent left-sided neck ,  tolerating secretions, no drooling or voice change.  Denies shortness of breath.  Physical exam shows tender thyroid bilaterally worse on the left, no erythema or overlying skin changes.  Regular rate and rhythm, lungs clear to auscultation.  Otherwise resting comfortably.   Will plan for ENT and IR recs, patient may need fine-needle biopsy under ultrasound guidance as per ENT note.   Patient dispo pending biopsy and consultant recommendations.

## 2024-11-26 NOTE — ED CDU PROVIDER SUBSEQUENT DAY NOTE - PROGRESS NOTE DETAILS
CDU PROGRESS NOTE SPRING KEARNS: CT resulted w/thyroid nodules better seen on US, mild tracheal narrowing/rightward deviation. pt well appearing phonating clearly, no respiratory distress. spoke with ENT SPRING Campa, pending final recs CDU NOTE SPRING Baumann: pt resting, does have some pain at anterior neck. no other complaints. NAD. VSS. as per ENT SPRING Blancas, recommendation is for IR guided FNA and then pt to f/up outpatient with head and neck. IR contacted, will review case and contact us back. CDU NOTE SPRING Baumann: contacted US for US guided FNA. per discussion, FNA will not be done until Friday, cannot be scheduled sooner. I reached out to IR who spoke with their attending, and also unable to get FNA done today. I spoke with patient and offered admission for US guided FNA to be scheduled for Friday. Pt does not want to wait and would like to go home. [place], f/up coordinator contacted and she is working on rapid f/up with Endo and Head & Neck who can send patient for FNA outpatient. ENT is ok with outpatient f/up if pt does not want to stay for FNA on Friday. pt comfortable going home, pt breathing fine, no dyspnea/sob. pt also reports able to eat/swallow without issue. pt would like to go home. And  also would like patient to be discharged.  As per Dr. Goss, pt stable for d/c home.

## 2024-11-27 PROBLEM — Z78.9 OTHER SPECIFIED HEALTH STATUS: Chronic | Status: ACTIVE | Noted: 2024-11-26

## 2024-11-27 NOTE — POST DISCHARGE NOTE - NOTIFICATION:
Patient's CT scan reflected on the Radiology Incidental Findings report for today. Patient is already established @  for follow up care.

## 2024-12-02 ENCOUNTER — APPOINTMENT (OUTPATIENT)
Dept: OTOLARYNGOLOGY | Facility: CLINIC | Age: 39
End: 2024-12-02

## 2025-01-06 ENCOUNTER — APPOINTMENT (OUTPATIENT)
Dept: OBGYN | Facility: CLINIC | Age: 40
End: 2025-01-06

## 2025-01-06 VITALS
BODY MASS INDEX: 18.64 KG/M2 | WEIGHT: 116 LBS | HEIGHT: 66 IN | DIASTOLIC BLOOD PRESSURE: 60 MMHG | SYSTOLIC BLOOD PRESSURE: 118 MMHG

## 2025-01-06 DIAGNOSIS — E07.9 DISORDER OF THYROID, UNSPECIFIED: ICD-10-CM

## 2025-01-06 DIAGNOSIS — Z34.90 ENCOUNTER FOR SUPERVISION OF NORMAL PREGNANCY, UNSPECIFIED, UNSPECIFIED TRIMESTER: ICD-10-CM

## 2025-01-06 DIAGNOSIS — Z01.411 ENCOUNTER FOR GYNECOLOGICAL EXAMINATION (GENERAL) (ROUTINE) WITH ABNORMAL FINDINGS: ICD-10-CM

## 2025-01-06 DIAGNOSIS — Z11.3 ENCOUNTER FOR SCREENING FOR INFECTIONS WITH A PREDOMINANTLY SEXUAL MODE OF TRANSMISSION: ICD-10-CM

## 2025-01-06 DIAGNOSIS — Z83.3 FAMILY HISTORY OF DIABETES MELLITUS: ICD-10-CM

## 2025-01-06 DIAGNOSIS — N91.2 AMENORRHEA, UNSPECIFIED: ICD-10-CM

## 2025-01-06 DIAGNOSIS — Z78.9 OTHER SPECIFIED HEALTH STATUS: ICD-10-CM

## 2025-01-06 PROCEDURE — 99385 PREV VISIT NEW AGE 18-39: CPT

## 2025-01-06 PROCEDURE — 76817 TRANSVAGINAL US OBSTETRIC: CPT

## 2025-01-06 PROCEDURE — 36415 COLL VENOUS BLD VENIPUNCTURE: CPT

## 2025-01-07 LAB
ABO + RH PNL BLD: NORMAL
ALBUMIN SERPL ELPH-MCNC: 4.5 G/DL
ALP BLD-CCNC: 40 U/L
ALT SERPL-CCNC: 7 U/L
ANION GAP SERPL CALC-SCNC: 12 MMOL/L
APTT BLD: 33.3 SEC
AST SERPL-CCNC: 11 U/L
BASOPHILS # BLD AUTO: 0.05 K/UL
BASOPHILS NFR BLD AUTO: 0.6 %
BILIRUB DIRECT SERPL-MCNC: 0.1 MG/DL
BILIRUB SERPL-MCNC: <0.2 MG/DL
BLD GP AB SCN SERPL QL: NORMAL
BUN SERPL-MCNC: 8 MG/DL
CALCIUM SERPL-MCNC: 9.2 MG/DL
CHLORIDE SERPL-SCNC: 100 MMOL/L
CO2 SERPL-SCNC: 21 MMOL/L
CREAT SERPL-MCNC: 0.38 MG/DL
EGFR: 131 ML/MIN/1.73M2
EOSINOPHIL # BLD AUTO: 0.1 K/UL
EOSINOPHIL NFR BLD AUTO: 1.3 %
FIBRINOGEN PPP-MCNC: 240 MG/DL
GLUCOSE SERPL-MCNC: 89 MG/DL
HCT VFR BLD CALC: 38 %
HGB A MFR BLD: 97.1 %
HGB A2 MFR BLD: 2.9 %
HGB BLD-MCNC: 12.5 G/DL
HGB FRACT BLD-IMP: NORMAL
HIV1+2 AB SPEC QL IA.RAPID: NONREACTIVE
IMM GRANULOCYTES NFR BLD AUTO: 0.4 %
INR PPP: 0.89 RATIO
LYMPHOCYTES # BLD AUTO: 1.39 K/UL
LYMPHOCYTES NFR BLD AUTO: 17.5 %
MAN DIFF?: NORMAL
MCHC RBC-ENTMCNC: 28.2 PG
MCHC RBC-ENTMCNC: 32.9 G/DL
MCV RBC AUTO: 85.8 FL
MONOCYTES # BLD AUTO: 0.74 K/UL
MONOCYTES NFR BLD AUTO: 9.3 %
NEUTROPHILS # BLD AUTO: 5.64 K/UL
NEUTROPHILS NFR BLD AUTO: 70.9 %
PLATELET # BLD AUTO: 308 K/UL
POTASSIUM SERPL-SCNC: 3.7 MMOL/L
PROT SERPL-MCNC: 7.1 G/DL
PT BLD: 10.6 SEC
RBC # BLD: 4.43 M/UL
RBC # FLD: 13.8 %
SODIUM SERPL-SCNC: 133 MMOL/L
TSH SERPL-ACNC: 0.49 UIU/ML
URATE SERPL-MCNC: 2 MG/DL
WBC # FLD AUTO: 7.95 K/UL

## 2025-01-08 LAB
C TRACH RRNA SPEC QL NAA+PROBE: NOT DETECTED
HBV SURFACE AG SER QL: NONREACTIVE
HCV AB SER QL: NONREACTIVE
HCV S/CO RATIO: 0.08 S/CO
MEV IGG FLD QL IA: >300 AU/ML
MEV IGG+IGM SER-IMP: POSITIVE
N GONORRHOEA RRNA SPEC QL NAA+PROBE: NOT DETECTED
RUBV IGG FLD-ACNC: 26.9 INDEX
RUBV IGG SER-IMP: POSITIVE
SOURCE TP AMPLIFICATION: NORMAL
T PALLIDUM AB SER QL IA: NEGATIVE
VZV AB TITR SER: POSITIVE
VZV IGG SER IF-ACNC: 25.2 S/CO

## 2025-01-13 ENCOUNTER — LABORATORY RESULT (OUTPATIENT)
Age: 40
End: 2025-01-13

## 2025-01-14 LAB
AR GENE MUT ANL BLD/T: NORMAL
B19V IGG SER QL IA: 0.24 INDEX
B19V IGG+IGM SER-IMP: NEGATIVE
B19V IGG+IGM SER-IMP: NORMAL
B19V IGM FLD-ACNC: 0.22 INDEX
B19V IGM SER-ACNC: NEGATIVE
CFTR MUT TESTED BLD/T: NEGATIVE
CYTOLOGY CVX/VAG DOC THIN PREP: NORMAL
FMR1 GENE MUT ANL BLD/T: NORMAL

## 2025-01-19 PROBLEM — Z83.3 FAMILY HISTORY OF DIABETES MELLITUS: Status: ACTIVE | Noted: 2025-01-06

## 2025-01-19 PROBLEM — Z78.9 NON-SMOKER: Status: ACTIVE | Noted: 2025-01-06

## 2025-01-19 PROBLEM — E07.9 THYROID MASS: Status: RESOLVED | Noted: 2025-01-06 | Resolved: 2025-01-19

## 2025-01-22 ENCOUNTER — LABORATORY RESULT (OUTPATIENT)
Age: 40
End: 2025-01-22

## 2025-01-27 ENCOUNTER — APPOINTMENT (OUTPATIENT)
Dept: ANTEPARTUM | Facility: CLINIC | Age: 40
End: 2025-01-27

## 2025-01-27 ENCOUNTER — ASOB RESULT (OUTPATIENT)
Age: 40
End: 2025-01-27

## 2025-01-27 PROCEDURE — 76801 OB US < 14 WKS SINGLE FETUS: CPT

## 2025-01-27 PROCEDURE — 76813 OB US NUCHAL MEAS 1 GEST: CPT

## 2025-02-03 ENCOUNTER — APPOINTMENT (OUTPATIENT)
Dept: OBGYN | Facility: CLINIC | Age: 40
End: 2025-02-03
Payer: COMMERCIAL

## 2025-02-03 VITALS
SYSTOLIC BLOOD PRESSURE: 106 MMHG | WEIGHT: 118 LBS | HEIGHT: 66 IN | DIASTOLIC BLOOD PRESSURE: 62 MMHG | BODY MASS INDEX: 18.96 KG/M2

## 2025-02-03 DIAGNOSIS — Z13.79 ENCOUNTER FOR OTHER SCREENING FOR GENETIC AND CHROMOSOMAL ANOMALIES: ICD-10-CM

## 2025-02-03 DIAGNOSIS — R35.0 FREQUENCY OF MICTURITION: ICD-10-CM

## 2025-02-03 DIAGNOSIS — Z3A.13 13 WEEKS GESTATION OF PREGNANCY: ICD-10-CM

## 2025-02-03 LAB
BILIRUB UR QL STRIP: NORMAL
GLUCOSE UR-MCNC: NORMAL
HCG UR QL: 0.2 EU/DL
HGB UR QL STRIP.AUTO: NORMAL
KETONES UR-MCNC: NORMAL
LEUKOCYTE ESTERASE UR QL STRIP: NORMAL
NITRITE UR QL STRIP: NORMAL
PH UR STRIP: 7
PROT UR STRIP-MCNC: NORMAL
SP GR UR STRIP: 1.02

## 2025-02-03 PROCEDURE — 0502F SUBSEQUENT PRENATAL CARE: CPT

## 2025-02-03 PROCEDURE — 36415 COLL VENOUS BLD VENIPUNCTURE: CPT

## 2025-02-07 ENCOUNTER — NON-APPOINTMENT (OUTPATIENT)
Age: 40
End: 2025-02-07

## 2025-02-10 ENCOUNTER — NON-APPOINTMENT (OUTPATIENT)
Age: 40
End: 2025-02-10

## 2025-02-12 ENCOUNTER — APPOINTMENT (OUTPATIENT)
Dept: OBGYN | Facility: CLINIC | Age: 40
End: 2025-02-12
Payer: COMMERCIAL

## 2025-02-12 PROCEDURE — 81003 URINALYSIS AUTO W/O SCOPE: CPT | Mod: QW

## 2025-02-14 LAB — BACTERIA UR CULT: NORMAL

## 2025-02-24 ENCOUNTER — APPOINTMENT (OUTPATIENT)
Dept: ANTEPARTUM | Facility: CLINIC | Age: 40
End: 2025-02-24

## 2025-02-24 ENCOUNTER — ASOB RESULT (OUTPATIENT)
Age: 40
End: 2025-02-24

## 2025-02-24 PROCEDURE — 76805 OB US >/= 14 WKS SNGL FETUS: CPT

## 2025-03-01 ENCOUNTER — APPOINTMENT (OUTPATIENT)
Dept: ANTEPARTUM | Facility: CLINIC | Age: 40
End: 2025-03-01
Payer: COMMERCIAL

## 2025-03-01 ENCOUNTER — EMERGENCY (EMERGENCY)
Facility: HOSPITAL | Age: 40
LOS: 1 days | Discharge: STILL ADMITTED OR ADMINISTRATIVE | End: 2025-03-01
Admitting: EMERGENCY MEDICINE

## 2025-03-01 ENCOUNTER — OUTPATIENT (OUTPATIENT)
Dept: INPATIENT UNIT | Facility: HOSPITAL | Age: 40
LOS: 1 days | Discharge: HOME | End: 2025-03-01
Payer: COMMERCIAL

## 2025-03-01 ENCOUNTER — ASOB RESULT (OUTPATIENT)
Age: 40
End: 2025-03-01

## 2025-03-01 VITALS — HEART RATE: 83 BPM | DIASTOLIC BLOOD PRESSURE: 76 MMHG | SYSTOLIC BLOOD PRESSURE: 129 MMHG

## 2025-03-01 VITALS
HEART RATE: 77 BPM | DIASTOLIC BLOOD PRESSURE: 75 MMHG | TEMPERATURE: 98 F | RESPIRATION RATE: 16 BRPM | SYSTOLIC BLOOD PRESSURE: 123 MMHG

## 2025-03-01 VITALS
SYSTOLIC BLOOD PRESSURE: 131 MMHG | OXYGEN SATURATION: 100 % | HEART RATE: 75 BPM | TEMPERATURE: 98 F | DIASTOLIC BLOOD PRESSURE: 84 MMHG | RESPIRATION RATE: 18 BRPM | HEIGHT: 63 IN | WEIGHT: 119.93 LBS

## 2025-03-01 DIAGNOSIS — Z98.891 HISTORY OF UTERINE SCAR FROM PREVIOUS SURGERY: Chronic | ICD-10-CM

## 2025-03-01 DIAGNOSIS — O26.899 OTHER SPECIFIED PREGNANCY RELATED CONDITIONS, UNSPECIFIED TRIMESTER: ICD-10-CM

## 2025-03-01 LAB
ALBUMIN SERPL ELPH-MCNC: 4 G/DL — SIGNIFICANT CHANGE UP (ref 3.3–5)
ALP SERPL-CCNC: 40 U/L — SIGNIFICANT CHANGE UP (ref 40–120)
ALT FLD-CCNC: 7 U/L — SIGNIFICANT CHANGE UP (ref 4–33)
AMYLASE P1 CFR SERPL: 118 U/L — SIGNIFICANT CHANGE UP (ref 25–125)
ANION GAP SERPL CALC-SCNC: 13 MMOL/L — SIGNIFICANT CHANGE UP (ref 7–14)
APPEARANCE UR: CLEAR — SIGNIFICANT CHANGE UP
AST SERPL-CCNC: 10 U/L — SIGNIFICANT CHANGE UP (ref 4–32)
BASOPHILS # BLD AUTO: 0.04 K/UL — SIGNIFICANT CHANGE UP (ref 0–0.2)
BASOPHILS NFR BLD AUTO: 0.4 % — SIGNIFICANT CHANGE UP (ref 0–2)
BILIRUB SERPL-MCNC: <0.2 MG/DL — SIGNIFICANT CHANGE UP (ref 0.2–1.2)
BILIRUB UR-MCNC: NEGATIVE — SIGNIFICANT CHANGE UP
BUN SERPL-MCNC: 10 MG/DL — SIGNIFICANT CHANGE UP (ref 7–23)
CALCIUM SERPL-MCNC: 8.8 MG/DL — SIGNIFICANT CHANGE UP (ref 8.4–10.5)
CHLORIDE SERPL-SCNC: 103 MMOL/L — SIGNIFICANT CHANGE UP (ref 98–107)
CO2 SERPL-SCNC: 19 MMOL/L — LOW (ref 22–31)
COLOR SPEC: YELLOW — SIGNIFICANT CHANGE UP
CREAT ?TM UR-MCNC: 75 MG/DL — SIGNIFICANT CHANGE UP
CREAT SERPL-MCNC: 0.34 MG/DL — LOW (ref 0.5–1.3)
DIFF PNL FLD: NEGATIVE — SIGNIFICANT CHANGE UP
EGFR: 135 ML/MIN/1.73M2 — SIGNIFICANT CHANGE UP
EGFR: 135 ML/MIN/1.73M2 — SIGNIFICANT CHANGE UP
EOSINOPHIL # BLD AUTO: 0.13 K/UL — SIGNIFICANT CHANGE UP (ref 0–0.5)
EOSINOPHIL NFR BLD AUTO: 1.2 % — SIGNIFICANT CHANGE UP (ref 0–6)
GLUCOSE SERPL-MCNC: 105 MG/DL — HIGH (ref 70–99)
GLUCOSE UR QL: NEGATIVE MG/DL — SIGNIFICANT CHANGE UP
HCT VFR BLD CALC: 34.2 % — LOW (ref 34.5–45)
HGB BLD-MCNC: 11.4 G/DL — LOW (ref 11.5–15.5)
IANC: 8.51 K/UL — HIGH (ref 1.8–7.4)
IMM GRANULOCYTES NFR BLD AUTO: 0.8 % — SIGNIFICANT CHANGE UP (ref 0–0.9)
KETONES UR-MCNC: ABNORMAL MG/DL
LEUKOCYTE ESTERASE UR-ACNC: NEGATIVE — SIGNIFICANT CHANGE UP
LIDOCAIN IGE QN: 22 U/L — SIGNIFICANT CHANGE UP (ref 7–60)
LYMPHOCYTES # BLD AUTO: 1.32 K/UL — SIGNIFICANT CHANGE UP (ref 1–3.3)
LYMPHOCYTES # BLD AUTO: 12.4 % — LOW (ref 13–44)
MCHC RBC-ENTMCNC: 28.8 PG — SIGNIFICANT CHANGE UP (ref 27–34)
MCHC RBC-ENTMCNC: 33.3 G/DL — SIGNIFICANT CHANGE UP (ref 32–36)
MCV RBC AUTO: 86.4 FL — SIGNIFICANT CHANGE UP (ref 80–100)
MONOCYTES # BLD AUTO: 0.57 K/UL — SIGNIFICANT CHANGE UP (ref 0–0.9)
MONOCYTES NFR BLD AUTO: 5.4 % — SIGNIFICANT CHANGE UP (ref 2–14)
NEUTROPHILS # BLD AUTO: 8.51 K/UL — HIGH (ref 1.8–7.4)
NEUTROPHILS NFR BLD AUTO: 79.8 % — HIGH (ref 43–77)
NITRITE UR-MCNC: NEGATIVE — SIGNIFICANT CHANGE UP
NRBC # BLD AUTO: 0 K/UL — SIGNIFICANT CHANGE UP (ref 0–0)
NRBC # FLD: 0 K/UL — SIGNIFICANT CHANGE UP (ref 0–0)
NRBC BLD AUTO-RTO: 0 /100 WBCS — SIGNIFICANT CHANGE UP (ref 0–0)
PH UR: 5.5 — SIGNIFICANT CHANGE UP (ref 5–8)
PLATELET # BLD AUTO: 283 K/UL — SIGNIFICANT CHANGE UP (ref 150–400)
POTASSIUM SERPL-MCNC: 3.5 MMOL/L — SIGNIFICANT CHANGE UP (ref 3.5–5.3)
POTASSIUM SERPL-SCNC: 3.5 MMOL/L — SIGNIFICANT CHANGE UP (ref 3.5–5.3)
PROT ?TM UR-MCNC: 12 MG/DL — SIGNIFICANT CHANGE UP
PROT SERPL-MCNC: 7.2 G/DL — SIGNIFICANT CHANGE UP (ref 6–8.3)
PROT UR-MCNC: NEGATIVE MG/DL — SIGNIFICANT CHANGE UP
PROT/CREAT UR-RTO: 0.2 RATIO — SIGNIFICANT CHANGE UP (ref 0–0.2)
RBC # BLD: 3.96 M/UL — SIGNIFICANT CHANGE UP (ref 3.8–5.2)
RBC # FLD: 14.7 % — HIGH (ref 10.3–14.5)
SODIUM SERPL-SCNC: 135 MMOL/L — SIGNIFICANT CHANGE UP (ref 135–145)
SP GR SPEC: 1.02 — SIGNIFICANT CHANGE UP (ref 1–1.03)
UROBILINOGEN FLD QL: 0.2 MG/DL — SIGNIFICANT CHANGE UP (ref 0.2–1)
WBC # BLD: 10.65 K/UL — HIGH (ref 3.8–10.5)
WBC # FLD AUTO: 10.65 K/UL — HIGH (ref 3.8–10.5)

## 2025-03-01 PROCEDURE — 76817 TRANSVAGINAL US OBSTETRIC: CPT | Mod: 26

## 2025-03-01 PROCEDURE — 76815 OB US LIMITED FETUS(S): CPT | Mod: 26

## 2025-03-01 PROCEDURE — 99221 1ST HOSP IP/OBS SF/LOW 40: CPT

## 2025-03-01 PROCEDURE — L9996: CPT

## 2025-03-01 RX ORDER — SODIUM CHLORIDE 9 G/1000ML
1000 INJECTION, SOLUTION INTRAVENOUS ONCE
Refills: 0 | Status: COMPLETED | OUTPATIENT
Start: 2025-03-01 | End: 2025-03-01

## 2025-03-01 RX ORDER — SIMETHICONE 80 MG
160 TABLET,CHEWABLE ORAL ONCE
Refills: 0 | Status: COMPLETED | OUTPATIENT
Start: 2025-03-01 | End: 2025-03-01

## 2025-03-01 RX ORDER — PRENATAL 136/IRON/FOLIC ACID 27 MG-1 MG
1 TABLET ORAL
Refills: 0 | DISCHARGE

## 2025-03-01 RX ADMIN — Medication 160 MILLIGRAM(S): at 22:28

## 2025-03-01 RX ADMIN — SODIUM CHLORIDE 1000 MILLILITER(S): 9 INJECTION, SOLUTION INTRAVENOUS at 22:29

## 2025-03-02 PROBLEM — Z78.9 OTHER SPECIFIED HEALTH STATUS: Chronic | Status: INACTIVE | Noted: 2024-11-26 | Resolved: 2025-03-01

## 2025-03-03 ENCOUNTER — APPOINTMENT (OUTPATIENT)
Dept: OBGYN | Facility: CLINIC | Age: 40
End: 2025-03-03
Payer: COMMERCIAL

## 2025-03-03 VITALS
WEIGHT: 120 LBS | DIASTOLIC BLOOD PRESSURE: 60 MMHG | HEIGHT: 66 IN | BODY MASS INDEX: 19.29 KG/M2 | SYSTOLIC BLOOD PRESSURE: 110 MMHG

## 2025-03-03 DIAGNOSIS — Z3A.17 17 WEEKS GESTATION OF PREGNANCY: ICD-10-CM

## 2025-03-03 DIAGNOSIS — O34.219 MATERNAL CARE FOR UNSPECIFIED TYPE SCAR FROM PREVIOUS CESAREAN DELIVERY: ICD-10-CM

## 2025-03-03 DIAGNOSIS — O99.282 ENDOCRINE, NUTRITIONAL AND METABOLIC DISEASES COMPLICATING PREGNANCY, SECOND TRIMESTER: ICD-10-CM

## 2025-03-03 DIAGNOSIS — O26.892 OTHER SPECIFIED PREGNANCY RELATED CONDITIONS, SECOND TRIMESTER: ICD-10-CM

## 2025-03-03 DIAGNOSIS — O24.419 GESTATIONAL DIABETES MELLITUS IN PREGNANCY, UNSPECIFIED CONTROL: ICD-10-CM

## 2025-03-03 DIAGNOSIS — Z87.59 PERSONAL HISTORY OF OTHER COMPLICATIONS OF PREGNANCY, CHILDBIRTH AND THE PUERPERIUM: ICD-10-CM

## 2025-03-03 DIAGNOSIS — R10.30 LOWER ABDOMINAL PAIN, UNSPECIFIED: ICD-10-CM

## 2025-03-03 DIAGNOSIS — E03.9 HYPOTHYROIDISM, UNSPECIFIED: ICD-10-CM

## 2025-03-03 DIAGNOSIS — R11.0 NAUSEA: ICD-10-CM

## 2025-03-03 PROCEDURE — 0502F SUBSEQUENT PRENATAL CARE: CPT

## 2025-03-03 PROCEDURE — 36415 COLL VENOUS BLD VENIPUNCTURE: CPT

## 2025-03-05 LAB
AFP INTERP SERPL-IMP: ABNORMAL
AFP INTERP SERPL-IMP: ABNORMAL
AFP MOM CUT-OFF: 2.5
AFP MOM SERPL: 2.63
AFP PERCENTILE: 99.9
AFP SERPL-ACNC: 122.9 NG/ML
CARBAMAZEPINE?: NO
CURRENT SMOKER: NO
DIABETES STATUS PATIENT: NO
GA: NORMAL
GESTATIONAL AGE METHOD: NORMAL
HX OF NTD NARR: NO
MULTIPLE PREGNANCY: NORMAL
NEURAL TUBE DEFECT RISK FETUS: ABNORMAL
NEURAL TUBE DEFECT RISK POP: NORMAL
RECOM F/U: NORMAL
TEST PERFORMANCE INFO SPEC: NORMAL
VALPROIC ACID?: NO

## 2025-03-06 PROBLEM — E03.9 HYPOTHYROIDISM, UNSPECIFIED: Chronic | Status: ACTIVE | Noted: 2025-03-01

## 2025-03-11 ENCOUNTER — TRANSCRIPTION ENCOUNTER (OUTPATIENT)
Age: 40
End: 2025-03-11

## 2025-03-11 ENCOUNTER — APPOINTMENT (OUTPATIENT)
Dept: MATERNAL FETAL MEDICINE | Facility: CLINIC | Age: 40
End: 2025-03-11
Payer: COMMERCIAL

## 2025-03-11 ENCOUNTER — APPOINTMENT (OUTPATIENT)
Dept: ANTEPARTUM | Facility: CLINIC | Age: 40
End: 2025-03-11
Payer: COMMERCIAL

## 2025-03-11 ENCOUNTER — ASOB RESULT (OUTPATIENT)
Age: 40
End: 2025-03-11

## 2025-03-11 PROCEDURE — 99214 OFFICE O/P EST MOD 30 MIN: CPT

## 2025-03-11 PROCEDURE — ZZZZZ: CPT

## 2025-03-11 PROCEDURE — 76811 OB US DETAILED SNGL FETUS: CPT

## 2025-03-12 DIAGNOSIS — O28.9 UNSPECIFIED ABNORMAL FINDINGS ON ANTENATAL SCREENING OF MOTHER: ICD-10-CM

## 2025-03-17 ENCOUNTER — ASOB RESULT (OUTPATIENT)
Age: 40
End: 2025-03-17

## 2025-03-17 ENCOUNTER — APPOINTMENT (OUTPATIENT)
Dept: MATERNAL FETAL MEDICINE | Facility: CLINIC | Age: 40
End: 2025-03-17
Payer: COMMERCIAL

## 2025-03-17 PROCEDURE — G0109 DIAB MANAGE TRN IND/GROUP: CPT | Mod: 95

## 2025-03-24 ENCOUNTER — ASOB RESULT (OUTPATIENT)
Age: 40
End: 2025-03-24

## 2025-03-24 ENCOUNTER — APPOINTMENT (OUTPATIENT)
Dept: ANTEPARTUM | Facility: CLINIC | Age: 40
End: 2025-03-24
Payer: COMMERCIAL

## 2025-03-24 ENCOUNTER — APPOINTMENT (OUTPATIENT)
Dept: ANTEPARTUM | Facility: CLINIC | Age: 40
End: 2025-03-24

## 2025-03-24 DIAGNOSIS — O28.3 ABNORMAL ULTRASONIC FINDING ON ANTENATAL SCREENING OF MOTHER: ICD-10-CM

## 2025-03-24 PROCEDURE — 76816 OB US FOLLOW-UP PER FETUS: CPT

## 2025-03-24 PROCEDURE — 99204 OFFICE O/P NEW MOD 45 MIN: CPT | Mod: 25

## 2025-03-25 LAB
BASOPHILS # BLD AUTO: 0.05 K/UL
BASOPHILS NFR BLD AUTO: 0.5 %
EOSINOPHIL # BLD AUTO: 0.18 K/UL
EOSINOPHIL NFR BLD AUTO: 1.7 %
HCT VFR BLD CALC: 38.1 %
HGB BLD-MCNC: 11.9 G/DL
IMM GRANULOCYTES NFR BLD AUTO: 1 %
LYMPHOCYTES # BLD AUTO: 1.63 K/UL
LYMPHOCYTES NFR BLD AUTO: 15.3 %
MAN DIFF?: NORMAL
MCHC RBC-ENTMCNC: 29 PG
MCHC RBC-ENTMCNC: 31.2 G/DL
MCV RBC AUTO: 92.7 FL
MONOCYTES # BLD AUTO: 0.77 K/UL
MONOCYTES NFR BLD AUTO: 7.2 %
NEUTROPHILS # BLD AUTO: 7.93 K/UL
NEUTROPHILS NFR BLD AUTO: 74.3 %
PLATELET # BLD AUTO: 304 K/UL
RBC # BLD: 4.11 M/UL
RBC # FLD: 16.2 %
WBC # FLD AUTO: 10.67 K/UL

## 2025-03-26 ENCOUNTER — APPOINTMENT (OUTPATIENT)
Dept: MATERNAL FETAL MEDICINE | Facility: CLINIC | Age: 40
End: 2025-03-26

## 2025-03-31 ENCOUNTER — APPOINTMENT (OUTPATIENT)
Dept: OBGYN | Facility: CLINIC | Age: 40
End: 2025-03-31

## 2025-03-31 VITALS
SYSTOLIC BLOOD PRESSURE: 104 MMHG | WEIGHT: 129 LBS | HEIGHT: 66 IN | BODY MASS INDEX: 20.73 KG/M2 | DIASTOLIC BLOOD PRESSURE: 62 MMHG

## 2025-03-31 DIAGNOSIS — Z3A.21 21 WEEKS GESTATION OF PREGNANCY: ICD-10-CM

## 2025-03-31 PROCEDURE — 99213 OFFICE O/P EST LOW 20 MIN: CPT

## 2025-04-01 ENCOUNTER — OUTPATIENT (OUTPATIENT)
Dept: OUTPATIENT SERVICES | Facility: HOSPITAL | Age: 40
LOS: 1 days | End: 2025-04-01
Payer: COMMERCIAL

## 2025-04-01 ENCOUNTER — APPOINTMENT (OUTPATIENT)
Dept: MRI IMAGING | Facility: CLINIC | Age: 40
End: 2025-04-01
Payer: COMMERCIAL

## 2025-04-01 DIAGNOSIS — Z98.891 HISTORY OF UTERINE SCAR FROM PREVIOUS SURGERY: Chronic | ICD-10-CM

## 2025-04-01 DIAGNOSIS — O28.3 ABNORMAL ULTRASONIC FINDING ON ANTENATAL SCREENING OF MOTHER: ICD-10-CM

## 2025-04-01 PROCEDURE — 72195 MRI PELVIS W/O DYE: CPT | Mod: 26

## 2025-04-01 PROCEDURE — 72195 MRI PELVIS W/O DYE: CPT

## 2025-04-02 ENCOUNTER — NON-APPOINTMENT (OUTPATIENT)
Age: 40
End: 2025-04-02

## 2025-04-02 LAB
BILIRUB UR QL STRIP: NORMAL
GLUCOSE UR-MCNC: NORMAL
HCG UR QL: 0.2 EU/DL
HGB UR QL STRIP.AUTO: NORMAL
KETONES UR-MCNC: NORMAL
LEUKOCYTE ESTERASE UR QL STRIP: NORMAL
NITRITE UR QL STRIP: NORMAL
PH UR STRIP: 7
PROT UR STRIP-MCNC: NORMAL
SP GR UR STRIP: 1.01

## 2025-04-03 ENCOUNTER — NON-APPOINTMENT (OUTPATIENT)
Age: 40
End: 2025-04-03

## 2025-04-03 DIAGNOSIS — O24.410 GESTATIONAL DIABETES MELLITUS IN PREGNANCY, DIET CONTROLLED: ICD-10-CM

## 2025-04-04 ENCOUNTER — NON-APPOINTMENT (OUTPATIENT)
Age: 40
End: 2025-04-04

## 2025-04-07 ENCOUNTER — ASOB RESULT (OUTPATIENT)
Age: 40
End: 2025-04-07

## 2025-04-07 ENCOUNTER — APPOINTMENT (OUTPATIENT)
Dept: ANTEPARTUM | Facility: CLINIC | Age: 40
End: 2025-04-07
Payer: COMMERCIAL

## 2025-04-07 PROCEDURE — 76817 TRANSVAGINAL US OBSTETRIC: CPT

## 2025-04-07 PROCEDURE — 99215 OFFICE O/P EST HI 40 MIN: CPT | Mod: 25

## 2025-04-07 PROCEDURE — 76816 OB US FOLLOW-UP PER FETUS: CPT

## 2025-04-08 ENCOUNTER — APPOINTMENT (OUTPATIENT)
Dept: ANTEPARTUM | Facility: CLINIC | Age: 40
End: 2025-04-08

## 2025-04-09 ENCOUNTER — APPOINTMENT (OUTPATIENT)
Dept: ANTEPARTUM | Facility: CLINIC | Age: 40
End: 2025-04-09

## 2025-04-09 ENCOUNTER — ASOB RESULT (OUTPATIENT)
Age: 40
End: 2025-04-09

## 2025-04-09 PROCEDURE — 99214 OFFICE O/P EST MOD 30 MIN: CPT | Mod: 25

## 2025-04-14 ENCOUNTER — NON-APPOINTMENT (OUTPATIENT)
Age: 40
End: 2025-04-14

## 2025-04-23 ENCOUNTER — APPOINTMENT (OUTPATIENT)
Dept: PEDIATRIC CARDIOLOGY | Facility: CLINIC | Age: 40
End: 2025-04-23

## 2025-04-28 ENCOUNTER — APPOINTMENT (OUTPATIENT)
Dept: OBGYN | Facility: CLINIC | Age: 40
End: 2025-04-28